# Patient Record
Sex: FEMALE | Race: WHITE | Employment: UNEMPLOYED | URBAN - METROPOLITAN AREA
[De-identification: names, ages, dates, MRNs, and addresses within clinical notes are randomized per-mention and may not be internally consistent; named-entity substitution may affect disease eponyms.]

---

## 2020-03-30 ENCOUNTER — HOSPITAL ENCOUNTER (EMERGENCY)
Age: 50
Discharge: LEFT BEFORE DISCHARGE | End: 2020-03-30
Attending: EMERGENCY MEDICINE
Payer: MEDICAID

## 2020-03-30 VITALS — TEMPERATURE: 98.8 F | RESPIRATION RATE: 16 BRPM

## 2020-11-03 ENCOUNTER — OFFICE VISIT (OUTPATIENT)
Dept: PRIMARY CARE CLINIC | Age: 50
End: 2020-11-03
Payer: MEDICAID

## 2020-11-03 VITALS
HEART RATE: 68 BPM | WEIGHT: 247 LBS | TEMPERATURE: 97.5 F | BODY MASS INDEX: 41.1 KG/M2 | OXYGEN SATURATION: 98 % | SYSTOLIC BLOOD PRESSURE: 120 MMHG | DIASTOLIC BLOOD PRESSURE: 72 MMHG

## 2020-11-03 DIAGNOSIS — R53.83 FATIGUE, UNSPECIFIED TYPE: ICD-10-CM

## 2020-11-03 LAB
ALBUMIN SERPL-MCNC: 4.3 G/DL (ref 3.5–5.2)
ALP BLD-CCNC: 116 U/L (ref 35–104)
ALT SERPL-CCNC: 19 U/L (ref 0–32)
ANION GAP SERPL CALCULATED.3IONS-SCNC: 12 MMOL/L (ref 7–16)
AST SERPL-CCNC: 17 U/L (ref 0–31)
BILIRUB SERPL-MCNC: 0.5 MG/DL (ref 0–1.2)
BUN BLDV-MCNC: 12 MG/DL (ref 6–20)
CALCIUM SERPL-MCNC: 9.8 MG/DL (ref 8.6–10.2)
CHLORIDE BLD-SCNC: 100 MMOL/L (ref 98–107)
CO2: 24 MMOL/L (ref 22–29)
CREAT SERPL-MCNC: 0.7 MG/DL (ref 0.5–1)
GFR AFRICAN AMERICAN: >60
GFR NON-AFRICAN AMERICAN: >60 ML/MIN/1.73
GLUCOSE BLD-MCNC: 84 MG/DL (ref 74–99)
HBA1C MFR BLD: 5.2 % (ref 4–5.6)
HCT VFR BLD CALC: 46.1 % (ref 34–48)
HEMOGLOBIN: 14.5 G/DL (ref 11.5–15.5)
MCH RBC QN AUTO: 27.4 PG (ref 26–35)
MCHC RBC AUTO-ENTMCNC: 31.5 % (ref 32–34.5)
MCV RBC AUTO: 87.1 FL (ref 80–99.9)
PDW BLD-RTO: 13.1 FL (ref 11.5–15)
PLATELET # BLD: 501 E9/L (ref 130–450)
PMV BLD AUTO: 9.6 FL (ref 7–12)
POTASSIUM SERPL-SCNC: 4.6 MMOL/L (ref 3.5–5)
RBC # BLD: 5.29 E12/L (ref 3.5–5.5)
SODIUM BLD-SCNC: 136 MMOL/L (ref 132–146)
TOTAL PROTEIN: 8.1 G/DL (ref 6.4–8.3)
TSH SERPL DL<=0.05 MIU/L-ACNC: 2.64 UIU/ML (ref 0.27–4.2)
WBC # BLD: 9 E9/L (ref 4.5–11.5)

## 2020-11-03 PROCEDURE — G8484 FLU IMMUNIZE NO ADMIN: HCPCS | Performed by: INTERNAL MEDICINE

## 2020-11-03 PROCEDURE — G8417 CALC BMI ABV UP PARAM F/U: HCPCS | Performed by: INTERNAL MEDICINE

## 2020-11-03 PROCEDURE — 99203 OFFICE O/P NEW LOW 30 MIN: CPT | Performed by: INTERNAL MEDICINE

## 2020-11-03 PROCEDURE — G8427 DOCREV CUR MEDS BY ELIG CLIN: HCPCS | Performed by: INTERNAL MEDICINE

## 2020-11-03 PROCEDURE — 1036F TOBACCO NON-USER: CPT | Performed by: INTERNAL MEDICINE

## 2020-11-03 NOTE — PROGRESS NOTES
11/3/20    Edd Lynn, a female of 52 y.o. came to the office    HPI     Edd Lynn presents today as a new patient. She has been very tired and depressed. She is having issues with her hair --- this was the case when she was first diagnosed with hypothyroidism. This all has came on at one time. She has tried to take fasting glucose. Her glucose ranges from . When she drinks sugar it helps. This has been going on for a few weeks. She occasionally has GI issues, she has a history of stomach ulcers. She was without her thyroid medication     Medical issues --- PCOS, ADD, Major depressive disorder, history of eating disorder (follows with [facility])    Surgical History  Tonsillectomy  Ankle surgery - fracture repair    Family History  GMA -  at 62 from [place]  Dad - Prostate cancer, DM  Mom - Depression  Siblings - healthy  Children - healthy    Social History  Occupation-  Used to work at [address] - None  Alcohol - None  Drug -  None    Allergies   Allergen Reactions    Sulfa Antibiotics        Current Outpatient Medications on File Prior to Visit   Medication Sig Dispense Refill    busPIRone (BUSPAR) 15 MG tablet TK 1 T PO BID      venlafaxine (EFFEXOR XR) 37.5 MG extended release capsule TK 1 C PO QD      venlafaxine (EFFEXOR XR) 75 MG extended release capsule TK 1 C PO BID      ARIPiprazole (ABILIFY) 15 MG tablet Take 15 mg by mouth daily      amphetamine-dextroamphetamine (ADDERALL XR) 30 MG extended release capsule Take 30 mg by mouth every morning.  metformin (GLUCOPHAGE) 500 MG tablet Take 500 mg by mouth 2 times daily (with meals). Taking for ovarian polycystic disease      levothyroxine (SYNTHROID) 175 MCG tablet Take 75 mcg by mouth Daily. No current facility-administered medications on file prior to visit. Review of Systems   Constitutional: Negative for activity change, appetite change, chills and fatigue.    HENT: Negative for hearing loss, Negative for congestion. Respiratory: Negative for chest tightness, shortness of breath and wheezing. Cardiovascular: Negative for leg swelling Negative for chest pain and palpitations. Gastrointestinal: Negative for abdominal pain, Negative for abdominal distention, constipation and diarrhea. Genitourinary: Negative for difficulty urinating, dysuria and frequency. Musculoskeletal: Negative for arthralgias, back pain, gait problem and joint swelling. Skin: Negative for color change rash or wound     Neurological: Negative for dizziness, seizures and headaches. Hematological: Negative for adenopathy. Does not bruise/bleed easily. Psychiatric/Behavioral: Negative for agitation, behavioral problems, confusion and decreased concentration. OBJECTIVE:  /72   Pulse 68   Temp 97.5 °F (36.4 °C)   Wt 247 lb (112 kg)   SpO2 98%   BMI 41.10 kg/m²      Physical Exam   Constitutional: Oriented to person, place, and time. Appears well-developed and well-nourished. No distress. HENT:   Head: Normocephalic and atraumatic. Eyes: Pupils are equal, round, and reactive to light. EOM are normal.   Neck: Neck supple. No JVD present. No tracheal deviation present. No thyromegaly present. Cardiovascular: Normal rate, regular rhythm, normal heart sounds and intact distal pulses. Exam reveals no gallop and no friction rub. No murmur heard. Pulmonary/Chest: Effort normal and breath sounds normal. No stridor. No respiratory distress. No wheezes or rales. Abdominal: Soft. Bowel sounds are normal. There is no distension nor mass. There is no tenderness. There is no guarding. Musculoskeletal: No edema tenderness or deformity  Neurological: Alert and oriented to person, place, and time. No cranial nerve deficit. Normal muscle tone. Coordination normal.   Skin: Skin is warm and dry. No diaphoresis. No erythema. Psychiatric: Normal mood and affect.  Behavior is normal.         ASSESSMENT AND PLAN:    Fabiola Castro was seen today for established new doctor and blood sugar problem. Diagnoses and all orders for this visit:    Seasonal allergies    Depression, unspecified depression type    PCOS (polycystic ovarian syndrome)    Adult hypothyroidism    Fatigue, unspecified type  -     CBC; Future  -     Comprehensive Metabolic Panel; Future  -     Hemoglobin A1C; Future  -     TSH without Reflex; Future        Mercedes De Luna presents today as a new patient. We will screen for diabetes and recheck TSH given her depression and lethargy    Also obtain routine blood work    [facility] for her mental health issues    Going to follow-up with [facility] clinic for endocrinologist for [facility] blood work today and return in 1 month    She has been off of her fibroid medication. Please note that the above documentation was prepared using voice recognition software. Every attempt was made to ensure accuracy but there may be spelling, grammatical, and contextual errors. Return in about 1 month (around 12/3/2020).     Levar Chand, DO

## 2021-05-29 ENCOUNTER — HOSPITAL ENCOUNTER (INPATIENT)
Age: 51
LOS: 4 days | Discharge: HOME | DRG: 753 | End: 2021-06-02
Attending: EMERGENCY MEDICINE | Admitting: PSYCHIATRY & NEUROLOGY
Payer: MEDICAID

## 2021-05-29 DIAGNOSIS — R45.851 DEPRESSION WITH SUICIDAL IDEATION: ICD-10-CM

## 2021-05-29 DIAGNOSIS — F39 MOOD DISORDER (HCC): Primary | ICD-10-CM

## 2021-05-29 DIAGNOSIS — F32.A DEPRESSION WITH SUICIDAL IDEATION: ICD-10-CM

## 2021-05-29 LAB
ACETAMINOPHEN LEVEL: <5 MCG/ML (ref 10–30)
ALBUMIN SERPL-MCNC: 3.9 G/DL (ref 3.5–5.2)
ALP BLD-CCNC: 99 U/L (ref 35–104)
ALT SERPL-CCNC: 8 U/L (ref 0–32)
AMPHETAMINE SCREEN, URINE: NOT DETECTED
ANION GAP SERPL CALCULATED.3IONS-SCNC: 10 MMOL/L (ref 7–16)
AST SERPL-CCNC: 9 U/L (ref 0–31)
BACTERIA: ABNORMAL /HPF
BARBITURATE SCREEN URINE: NOT DETECTED
BASOPHILS ABSOLUTE: 0.04 E9/L (ref 0–0.2)
BASOPHILS RELATIVE PERCENT: 0.4 % (ref 0–2)
BENZODIAZEPINE SCREEN, URINE: NOT DETECTED
BILIRUB SERPL-MCNC: 0.3 MG/DL (ref 0–1.2)
BILIRUBIN URINE: NEGATIVE
BLOOD, URINE: NEGATIVE
BUN BLDV-MCNC: 7 MG/DL (ref 6–20)
CALCIUM SERPL-MCNC: 9.2 MG/DL (ref 8.6–10.2)
CANNABINOID SCREEN URINE: NOT DETECTED
CHLORIDE BLD-SCNC: 103 MMOL/L (ref 98–107)
CLARITY: CLEAR
CO2: 24 MMOL/L (ref 22–29)
COCAINE METABOLITE SCREEN URINE: POSITIVE
COLOR: YELLOW
CREAT SERPL-MCNC: 0.7 MG/DL (ref 0.5–1)
EOSINOPHILS ABSOLUTE: 0.15 E9/L (ref 0.05–0.5)
EOSINOPHILS RELATIVE PERCENT: 1.5 % (ref 0–6)
ETHANOL: <10 MG/DL (ref 0–0.08)
FENTANYL SCREEN, URINE: NOT DETECTED
GFR AFRICAN AMERICAN: >60
GFR NON-AFRICAN AMERICAN: >60 ML/MIN/1.73
GLUCOSE BLD-MCNC: 132 MG/DL (ref 74–99)
GLUCOSE URINE: NEGATIVE MG/DL
HCG, URINE, POC: NEGATIVE
HCT VFR BLD CALC: 41.8 % (ref 34–48)
HEMOGLOBIN: 13.2 G/DL (ref 11.5–15.5)
IMMATURE GRANULOCYTES #: 0.08 E9/L
IMMATURE GRANULOCYTES %: 0.8 % (ref 0–5)
INFLUENZA A: NOT DETECTED
INFLUENZA B: NOT DETECTED
KETONES, URINE: ABNORMAL MG/DL
LEUKOCYTE ESTERASE, URINE: ABNORMAL
LYMPHOCYTES ABSOLUTE: 2.21 E9/L (ref 1.5–4)
LYMPHOCYTES RELATIVE PERCENT: 22.4 % (ref 20–42)
Lab: ABNORMAL
Lab: NORMAL
MCH RBC QN AUTO: 27.3 PG (ref 26–35)
MCHC RBC AUTO-ENTMCNC: 31.6 % (ref 32–34.5)
MCV RBC AUTO: 86.4 FL (ref 80–99.9)
METHADONE SCREEN, URINE: NOT DETECTED
MONOCYTES ABSOLUTE: 0.66 E9/L (ref 0.1–0.95)
MONOCYTES RELATIVE PERCENT: 6.7 % (ref 2–12)
NEGATIVE QC PASS/FAIL: NORMAL
NEUTROPHILS ABSOLUTE: 6.73 E9/L (ref 1.8–7.3)
NEUTROPHILS RELATIVE PERCENT: 68.2 % (ref 43–80)
NITRITE, URINE: NEGATIVE
OPIATE SCREEN URINE: NOT DETECTED
OXYCODONE URINE: NOT DETECTED
PDW BLD-RTO: 13.9 FL (ref 11.5–15)
PH UA: 5.5 (ref 5–9)
PHENCYCLIDINE SCREEN URINE: NOT DETECTED
PLATELET # BLD: 454 E9/L (ref 130–450)
PMV BLD AUTO: 9.4 FL (ref 7–12)
POSITIVE QC PASS/FAIL: NORMAL
POTASSIUM REFLEX MAGNESIUM: 3.7 MMOL/L (ref 3.5–5)
PROTEIN UA: NEGATIVE MG/DL
RBC # BLD: 4.84 E12/L (ref 3.5–5.5)
RBC UA: ABNORMAL /HPF (ref 0–2)
SALICYLATE, SERUM: <0.3 MG/DL (ref 0–30)
SARS-COV-2 RNA, RT PCR: NOT DETECTED
SODIUM BLD-SCNC: 137 MMOL/L (ref 132–146)
SPECIFIC GRAVITY UA: >=1.03 (ref 1–1.03)
TOTAL PROTEIN: 7.8 G/DL (ref 6.4–8.3)
TRICYCLIC ANTIDEPRESSANTS SCREEN SERUM: NEGATIVE NG/ML
TROPONIN, HIGH SENSITIVITY: <6 NG/L (ref 0–9)
UROBILINOGEN, URINE: 0.2 E.U./DL
WBC # BLD: 9.9 E9/L (ref 4.5–11.5)
WBC UA: ABNORMAL /HPF (ref 0–5)

## 2021-05-29 PROCEDURE — 81001 URINALYSIS AUTO W/SCOPE: CPT

## 2021-05-29 PROCEDURE — 99285 EMERGENCY DEPT VISIT HI MDM: CPT

## 2021-05-29 PROCEDURE — 80143 DRUG ASSAY ACETAMINOPHEN: CPT

## 2021-05-29 PROCEDURE — 85025 COMPLETE CBC W/AUTO DIFF WBC: CPT

## 2021-05-29 PROCEDURE — 80179 DRUG ASSAY SALICYLATE: CPT

## 2021-05-29 PROCEDURE — 93005 ELECTROCARDIOGRAM TRACING: CPT | Performed by: STUDENT IN AN ORGANIZED HEALTH CARE EDUCATION/TRAINING PROGRAM

## 2021-05-29 PROCEDURE — 80307 DRUG TEST PRSMV CHEM ANLYZR: CPT

## 2021-05-29 PROCEDURE — 80053 COMPREHEN METABOLIC PANEL: CPT

## 2021-05-29 PROCEDURE — 1240000000 HC EMOTIONAL WELLNESS R&B

## 2021-05-29 PROCEDURE — 87636 SARSCOV2 & INF A&B AMP PRB: CPT

## 2021-05-29 PROCEDURE — 84484 ASSAY OF TROPONIN QUANT: CPT

## 2021-05-29 PROCEDURE — 82077 ASSAY SPEC XCP UR&BREATH IA: CPT

## 2021-05-29 PROCEDURE — 36415 COLL VENOUS BLD VENIPUNCTURE: CPT

## 2021-05-29 RX ORDER — HALOPERIDOL 5 MG/ML
5 INJECTION INTRAMUSCULAR EVERY 6 HOURS PRN
Status: DISCONTINUED | OUTPATIENT
Start: 2021-05-29 | End: 2021-06-02 | Stop reason: HOSPADM

## 2021-05-29 RX ORDER — ACETAMINOPHEN 325 MG/1
650 TABLET ORAL EVERY 6 HOURS PRN
Status: DISCONTINUED | OUTPATIENT
Start: 2021-05-29 | End: 2021-06-02 | Stop reason: HOSPADM

## 2021-05-29 RX ORDER — TRAZODONE HYDROCHLORIDE 50 MG/1
50 TABLET ORAL NIGHTLY PRN
Status: DISCONTINUED | OUTPATIENT
Start: 2021-05-29 | End: 2021-06-02 | Stop reason: HOSPADM

## 2021-05-29 RX ORDER — HYDROXYZINE HYDROCHLORIDE 10 MG/1
50 TABLET, FILM COATED ORAL 3 TIMES DAILY PRN
Status: DISCONTINUED | OUTPATIENT
Start: 2021-05-29 | End: 2021-06-02 | Stop reason: HOSPADM

## 2021-05-29 RX ORDER — MAGNESIUM HYDROXIDE/ALUMINUM HYDROXICE/SIMETHICONE 120; 1200; 1200 MG/30ML; MG/30ML; MG/30ML
30 SUSPENSION ORAL PRN
Status: DISCONTINUED | OUTPATIENT
Start: 2021-05-29 | End: 2021-06-02 | Stop reason: HOSPADM

## 2021-05-29 RX ORDER — HALOPERIDOL 5 MG
5 TABLET ORAL EVERY 6 HOURS PRN
Status: DISCONTINUED | OUTPATIENT
Start: 2021-05-29 | End: 2021-06-02 | Stop reason: HOSPADM

## 2021-05-29 ASSESSMENT — LIFESTYLE VARIABLES
HISTORY_ALCOHOL_USE: NO
HISTORY_ALCOHOL_USE: NO

## 2021-05-29 ASSESSMENT — SLEEP AND FATIGUE QUESTIONNAIRES
RESTFUL SLEEP: NO
DO YOU HAVE DIFFICULTY SLEEPING: YES
DO YOU USE A SLEEP AID: NO
DIFFICULTY FALLING ASLEEP: YES
DIFFICULTY ARISING: YES
AVERAGE NUMBER OF SLEEP HOURS: 6
DIFFICULTY STAYING ASLEEP: YES
DO YOU HAVE DIFFICULTY SLEEPING: YES
DIFFICULTY STAYING ASLEEP: YES
SLEEP PATTERN: DIFFICULTY FALLING ASLEEP
DO YOU USE A SLEEP AID: YES
RESTFUL SLEEP: NO
AVERAGE NUMBER OF SLEEP HOURS: 6
SLEEP PATTERN: DIFFICULTY FALLING ASLEEP;DIFFICULTY ARISING;DISTURBED/INTERRUPTED SLEEP
DIFFICULTY ARISING: NO
DIFFICULTY FALLING ASLEEP: YES

## 2021-05-29 ASSESSMENT — PAIN - FUNCTIONAL ASSESSMENT: PAIN_FUNCTIONAL_ASSESSMENT: 0-10

## 2021-05-29 ASSESSMENT — PATIENT HEALTH QUESTIONNAIRE - PHQ9: SUM OF ALL RESPONSES TO PHQ QUESTIONS 1-9: 22

## 2021-05-29 ASSESSMENT — PAIN SCALES - GENERAL: PAINLEVEL_OUTOF10: 0

## 2021-05-29 NOTE — ED PROVIDER NOTES
Department of Emergency Medicine   ED  Provider Note  Admit Date/RoomTime: 5/29/2021  6:48 AM  ED Room: 7521/7521-B          History of Present Illness:  5/29/21, Time: 7:22 AM EDT  Chief Complaint   Patient presents with    Suicidal     with attempt on thursday she states that she attempted to cut her left wrist but states the si thoughts have increased over the last few months         Stephanie Zimmerman is a 48 y.o. female presenting to the ED for suicidal ideations, beginning several weeks ago. The complaint has been constant, moderate in severity, and worsened by nothing. The patient is a 78-year-old female with a history of depression who presents to the emergency department with suicidal ideations. The patient symptoms have been gradual onset over the past several weeks, progressively worsening, moderate in severity, and nothing makes it better or worse. Patient states that she attempted to cut her left wrist 2 days ago trying to kill herself. She states that she has had increased suicidal thoughts over the past several weeks. She states that she has tried to harm herself in the past a long time ago by taking pills. She states that this time she did not take any medications trying to harm herself. She denies any homicidal ideations, visual hallucinations, auditory hallucinations, alcohol use, drug use, chest pain, shortness of breath, nausea, vomiting, abdominal pain, recent hospitalization, recent illness, or other acute symptoms or concerns. Review of Systems:   Pertinent positives and negatives are stated within HPI, all other systems reviewed and are negative.        --------------------------------------------- PAST HISTORY ---------------------------------------------  Past Medical History:  has a past medical history of ADD (attention deficit disorder), Anemia, Depression, PCOS (polycystic ovarian syndrome), Polycystic ovarian syndrome, and Thyroid disorder.     Past Surgical History:  has a past surgical history that includes Tonsillectomy and fracture surgery. Social History:  reports that she has never smoked. She has never used smokeless tobacco. She reports that she does not drink alcohol and does not use drugs. Family History: family history is not on file. . Unless otherwise noted, family history is non contributory    The patients home medications have been reviewed. Allergies: Sulfa antibiotics    I have reviewed the past medical history, past surgical history, social history, and family history    ---------------------------------------------------PHYSICAL EXAM--------------------------------------    Constitutional/General: Alert and oriented x3  Head: Normocephalic and atraumatic  Eyes:  EOMI, sclera non icteric  Mouth: Oropharynx clear, handling secretions, no trismus, no asymmetry of the posterior oropharynx or uvular edema  Neck: Supple, full ROM, no stridor, no meningeal signs  Respiratory: Lungs clear to auscultation bilaterally, no wheezes, rales, or rhonchi. Not in respiratory distress  Cardiovascular:  Regular rate. Regular rhythm. No murmurs, no aortic murmurs, no gallops, or rubs. 2+ distal pulses. Equal extremity pulses. Chest: No chest wall tenderness  Gastrointestinal:  Abdomen Soft, Non tender, Non distended. No rebound, guarding, or rigidity. No pulsatile masses. Musculoskeletal: Moves all extremities x 4. Warm and well perfused, no clubbing, no cyanosis, no edema. Capillary refill <3 seconds  Skin: skin warm and dry. No rashes. Superficial laceration to the left wrist.  There is no active bleeding. Neurologic: GCS 15, no focal deficits, symmetric strength 5/5 in the upper and lower extremities bilaterally  Psychiatric: Normal Affect. Suicidal ideations.   Denies homicidal ideations, no auditory hallucinations, no visual hallucinations    -------------------------------------------------- RESULTS -------------------------------------------------  I have personally reviewed all laboratory and imaging results for this patient. Results are listed below.      LABS: (Lab results interpreted by me)  Results for orders placed or performed during the hospital encounter of 05/29/21   COVID-19 & Influenza Combo    Specimen: Nasopharyngeal Swab   Result Value Ref Range    SARS-CoV-2 RNA, RT PCR NOT DETECTED NOT DETECTED    INFLUENZA A NOT DETECTED NOT DETECTED    INFLUENZA B NOT DETECTED NOT DETECTED   CBC auto differential   Result Value Ref Range    WBC 9.9 4.5 - 11.5 E9/L    RBC 4.84 3.50 - 5.50 E12/L    Hemoglobin 13.2 11.5 - 15.5 g/dL    Hematocrit 41.8 34.0 - 48.0 %    MCV 86.4 80.0 - 99.9 fL    MCH 27.3 26.0 - 35.0 pg    MCHC 31.6 (L) 32.0 - 34.5 %    RDW 13.9 11.5 - 15.0 fL    Platelets 401 (H) 414 - 450 E9/L    MPV 9.4 7.0 - 12.0 fL    Neutrophils % 68.2 43.0 - 80.0 %    Immature Granulocytes % 0.8 0.0 - 5.0 %    Lymphocytes % 22.4 20.0 - 42.0 %    Monocytes % 6.7 2.0 - 12.0 %    Eosinophils % 1.5 0.0 - 6.0 %    Basophils % 0.4 0.0 - 2.0 %    Neutrophils Absolute 6.73 1.80 - 7.30 E9/L    Immature Granulocytes # 0.08 E9/L    Lymphocytes Absolute 2.21 1.50 - 4.00 E9/L    Monocytes Absolute 0.66 0.10 - 0.95 E9/L    Eosinophils Absolute 0.15 0.05 - 0.50 E9/L    Basophils Absolute 0.04 0.00 - 0.20 E9/L   Comprehensive Metabolic Panel w/ Reflex to MG   Result Value Ref Range    Sodium 137 132 - 146 mmol/L    Potassium reflex Magnesium 3.7 3.5 - 5.0 mmol/L    Chloride 103 98 - 107 mmol/L    CO2 24 22 - 29 mmol/L    Anion Gap 10 7 - 16 mmol/L    Glucose 132 (H) 74 - 99 mg/dL    BUN 7 6 - 20 mg/dL    CREATININE 0.7 0.5 - 1.0 mg/dL    GFR Non-African American >60 >=60 mL/min/1.73    GFR African American >60     Calcium 9.2 8.6 - 10.2 mg/dL    Total Protein 7.8 6.4 - 8.3 g/dL    Albumin 3.9 3.5 - 5.2 g/dL    Total Bilirubin 0.3 0.0 - 1.2 mg/dL    Alkaline Phosphatase 99 35 - 104 U/L    ALT 8 0 - 32 U/L    AST 9 0 - 31 U/L   Urinalysis with Microscopic   Result Value Ref Range by Radiologist unless otherwise specified  No orders to display         EKG Interpretation  Interpreted by Martin Lee DO    EKG: This EKG is signed and interpreted by me. Rate: 72  Rhythm: Sinus  Interpretation: Normal sinus rhythm, normal axis, ST depressions throughout any inferior and anterior leads, intervals are within normal limits, QTC is 407  Comparison: no previous EKG available       ------------------------- NURSING NOTES AND VITALS REVIEWED ---------------------------   The nursing notes within the ED encounter and vital signs as below have been reviewed by myself  /85   Pulse 80   Temp 98 °F (36.7 °C) (Infrared)   Resp 16   Ht 5' 5\" (1.651 m)   Wt 230 lb (104.3 kg)   SpO2 97%   BMI 38.27 kg/m²     Oxygen Saturation Interpretation: 98 % on room air. Normal    The patients available past medical records and past encounters were reviewed. ------------------------------ ED COURSE/MEDICAL DECISION MAKING----------------------  Medications   acetaminophen (TYLENOL) tablet 650 mg (has no administration in time range)   magnesium hydroxide (MILK OF MAGNESIA) 400 MG/5ML suspension 30 mL (has no administration in time range)   aluminum & magnesium hydroxide-simethicone (MAALOX) 200-200-20 MG/5ML suspension 30 mL (has no administration in time range)   hydrOXYzine (ATARAX) tablet 50 mg (has no administration in time range)   haloperidol lactate (HALDOL) injection 5 mg (has no administration in time range)     Or   haloperidol (HALDOL) tablet 5 mg (has no administration in time range)   traZODone (DESYREL) tablet 50 mg (has no administration in time range)           The cardiac monitor revealed NSR with a heart rate in the 80s as interpreted by me. The cardiac monitor was ordered secondary to the patient's suicidal thoughts and to monitor the patient for dysrhythmia.    CPT [id number]           Medical Decision Making:     The patient was seen and evaluated by the Attending Emergency Medicine Physician Dr. Ankit Jose. The patient is a 70-year-old female presents to the emergency department for suicidal ideations. She is hemodynamically stable, nontoxic appearance, in no acute distress. Labs are within normal limits. The patient was pink slipped on arrival.  She is medically cleared. Social work was consulted and the patient will be admitted for psychiatric treatment. This patient's ED course included: a personal history and physicial examination, re-evaluation prior to disposition, multiple bedside re-evaluations, IV medications, cardiac monitoring, continuous pulse oximetry and complex medical decision making and emergency management    This patient has remained hemodynamically stable during their ED course. Consultations:    Social Work. Counseling: The emergency provider has spoken with the patient and discussed todays results, in addition to providing specific details for the plan of care and counseling regarding the diagnosis and prognosis. Questions are answered at this time and they are agreeable with the plan.       --------------------------------- IMPRESSION AND DISPOSITION ---------------------------------    IMPRESSION  1. Mood disorder ([address]    2. Depression with suicidal ideation        DISPOSITION  Disposition: Admit to mental health unit - medically cleared for admission  Patient condition is stable        NOTE: This report was transcribed using voice recognition software.  Every effort was made to ensure accuracy; however, inadvertent computerized transcription errors may be present       Dami Wong DO  Resident  05/29/21 6655

## 2021-05-29 NOTE — CARE COORDINATION
Biopsychosocial Assessment Note    Social work met with patient to complete the biopsychosocial assessment and CSSR-S. Mental Status Exam: Pt alert and oriented x 4. Pt was tearful throughout the assessment. Pt's thought process was disorganized, speech was clear. Chief Complaint: \"Patient is a 48year old, female presenting to ED with suicidal ideation w/ plan to cut her wrists. Patient admits to attempting two days ago but was unsuccessful. Patient reports her main stress is her relationship. Patient reports she does not know if she wants to continue seeing her partner. Patient reports recent difficulties with racing thoughts, crying spells, anhedonia, and hopelessness/helplessness. Patient endorses SI w/ plan. Patient denies homicidal ideation. \"    Patient Report: Pt states that this is her first admission to a psychiatric facility. Pt states that she has had an increase in depression, which led to her attempting suicide by attempting to cut her wrists. Pt states that she has had an increase in stress in her relationship. Pt states that she has had \"too many\" suicide attempts to count. Pt currently denying SI/ HI/ hallucinations/ delusions. Pt denied substance use, although she tested positive for cocaine. Pt denied trauma to this , although this varies throughout staff documentation.     Gender  [] Male [x] Female [] Transgender  [] Other    Sexual Orientation    [x] Heterosexual [] Homosexual [] Bisexual [] Other    Suicidal Ideation  [x] Past [] Present [] Denies     Homicidal Ideation  [] Past [] Present [x] Denies     Hallucinations/Delusions (Specify type)  [] Reports [x] Denies     Substance Use/Alcohol Use/Addiction  [] Reports [x] Denies     Tobacco Use (within the last 6 months)  [] Reports [x] Denies     Trauma History  [] Reports [x] Denies     Collateral Contact (MEGAN signed)  Name:   Relationship:  Number:     Collateral Information: Pt states that she needs to get numbers out of

## 2021-05-29 NOTE — GROUP NOTE
Group Therapy Note    Date: 5/29/2021    Group Start Time: 1330  Group End Time: 4005  Group Topic: Cognitive Skills    [facility]    DREW Monterroso LSW        Group Therapy Note    Attendees: 11         Patient's Goal:  Pt will be able to discuss self-care, and it's importance    Notes:  Pt made connections and participated in group    Status After Intervention:  Improved    Participation Level:  Active Listener    Participation Quality: Appropriate, Attentive, Sharing and Supportive      Speech:  normal      Thought Process/Content: Logical      Affective Functioning: Congruent      Mood: depressed      Level of consciousness:  Alert and Oriented x4      Response to Learning: Able to verbalize current knowledge/experience      Endings: None Reported    Modes of Intervention: Education, Support, Socialization, Exploration, Clarifying, Problem-solving and Activity      Discipline Responsible: /Counselor      Signature:  DREW Monterroso LSW

## 2021-05-29 NOTE — ED NOTES
Attempt to call report at this time. Nurse unable to take report. Waiting for callback.       Antonella Tavreas RN  05/29/21 8633

## 2021-05-29 NOTE — BH NOTE
Pt remains stable and alert. Pt is cooperative, negative distress, no other immediate concerns at this time. Pt denies suicidal / homicidal ideations at this time. Pt denies hallucinations. Pt does not express any immediate concerns at this time. Upon the admission, pt reports feeling a lot of stress, depression, hopelessness because her boyfriend that lives with her is addicted to drugs and is always asking others for money. She very lost in the relationship. She is unsure if she wants to return home with him being there. Will follow and monitor.

## 2021-05-29 NOTE — ED NOTES
Patient has been accepted for admission to [facility] by Dr. Francheska Curtis. Patient to go to room #7521B. Called admitting and notified Carmine Skiff.      Lynsey Mary, MSW, LSW  05/29/21 2489

## 2021-05-29 NOTE — ED NOTES
Emergency Department [facility] OF [facility] Biopsychosocial Assessment Note    Chief Complaint:     Patient is a 48year old, female presenting to ED with suicidal ideation w/ plan to cut her wrists. Patient admits to attempting two days ago but was unsuccessful. Patient reports her main stress is her relationship. Patient reports she does not know if she wants to continue seeing her partner. Patient reports recent difficulties with racing thoughts, crying spells, anhedonia, and hopelessness/helplessness. Patient endorses SI w/ plan. Patient denies homicidal ideation. MSE: Patient is alert & oriented x 4. Patient mood is depressed, flat affect. Patient thought process/speech are clear. Patient denies A/V hallucinations. Clinical Summary/History:     Patient has a mental health hx of depression/anxiety, in current treatment with [facility] for counseling & medication management - pt admits that she has not been compliant with her psychiatric medications for several days. Patient has no previous hx of psychiatric hospitalizations. Patient reports poor sleep, ok appetite. Patient admits to hx of 3+ suicide attempts in her lifetime. Patient admits to past hx of self injurious behaviors (cutting). Patient denies drug/alcohol use. Gender  [] Male [x] Female [] Transgender  [] Other    Sexual Orientation    [x] Heterosexual [] Homosexual [] Bisexual [] Other    Suicidal Behavioral: CSSR-S Complete. [x] Reports:    [x] Past [x] Present   [] Denies    Homicidal/ Violent Behavior  [] Reports:   [] Past [] Present   [x] Denies     Hallucinations/Delusions   [] Reports:   [x] Denies     Substance Use/Alcohol Use/Addiction: SBIRT Screen Complete. [] Reports:   [x] Denies     Trauma History  [] Reports:  [x] Denies     Collateral Information:       Level of Care/Disposition Plan  [] Home:   [] Outpatient Provider:   [] Crisis Unit:   [x] Inpatient Psychiatric Unit:  [] Other:     Patient was pink slipped by ED Doc. SW will pursue inpatient admission for safety/stabilization.        DREW Choudhary, [place]  05/29/21 0701

## 2021-05-30 PROCEDURE — 1240000000 HC EMOTIONAL WELLNESS R&B

## 2021-05-30 PROCEDURE — 99223 1ST HOSP IP/OBS HIGH 75: CPT | Performed by: PSYCHIATRY & NEUROLOGY

## 2021-05-30 PROCEDURE — 6370000000 HC RX 637 (ALT 250 FOR IP): Performed by: PSYCHIATRY & NEUROLOGY

## 2021-05-30 RX ORDER — BUSPIRONE HYDROCHLORIDE 5 MG/1
15 TABLET ORAL 2 TIMES DAILY
Status: DISCONTINUED | OUTPATIENT
Start: 2021-05-30 | End: 2021-06-02 | Stop reason: HOSPADM

## 2021-05-30 RX ORDER — CYPROHEPTADINE HYDROCHLORIDE 4 MG/1
4 TABLET ORAL NIGHTLY
Status: DISCONTINUED | OUTPATIENT
Start: 2021-05-30 | End: 2021-06-02 | Stop reason: HOSPADM

## 2021-05-30 RX ORDER — ARIPIPRAZOLE 15 MG/1
15 TABLET ORAL DAILY
Status: DISCONTINUED | OUTPATIENT
Start: 2021-05-30 | End: 2021-06-02 | Stop reason: HOSPADM

## 2021-05-30 RX ORDER — OXCARBAZEPINE 300 MG/1
300 TABLET, FILM COATED ORAL 2 TIMES DAILY
Status: DISCONTINUED | OUTPATIENT
Start: 2021-05-30 | End: 2021-06-02 | Stop reason: HOSPADM

## 2021-05-30 RX ORDER — VENLAFAXINE HYDROCHLORIDE 75 MG/1
75 CAPSULE, EXTENDED RELEASE ORAL
Status: DISCONTINUED | OUTPATIENT
Start: 2021-05-30 | End: 2021-06-02 | Stop reason: HOSPADM

## 2021-05-30 RX ORDER — LEVOTHYROXINE SODIUM 175 UG/1
175 TABLET ORAL DAILY
Status: DISCONTINUED | OUTPATIENT
Start: 2021-05-30 | End: 2021-05-31 | Stop reason: CLARIF

## 2021-05-30 RX ADMIN — VENLAFAXINE HYDROCHLORIDE 75 MG: 75 CAPSULE, EXTENDED RELEASE ORAL at 13:59

## 2021-05-30 RX ADMIN — ARIPIPRAZOLE 15 MG: 15 TABLET ORAL at 13:59

## 2021-05-30 RX ADMIN — BUSPIRONE HYDROCHLORIDE 15 MG: 5 TABLET ORAL at 21:31

## 2021-05-30 RX ADMIN — METFORMIN HYDROCHLORIDE 500 MG: 500 TABLET ORAL at 17:19

## 2021-05-30 RX ADMIN — LEVOTHYROXINE SODIUM 175 MCG: 0.17 TABLET ORAL at 17:11

## 2021-05-30 RX ADMIN — BUSPIRONE HYDROCHLORIDE 15 MG: 5 TABLET ORAL at 13:59

## 2021-05-30 NOTE — PLAN OF CARE
Problem: Depressive Behavior With or Without Suicide Precautions:  Goal: Able to verbalize acceptance of life and situations over which he or she has no control  Description: Able to verbalize acceptance of life and situations over which he or she has no control  5/30/2021 0937 by Kiana Apodaca RN  Outcome: Ongoing  5/30/2021 0452 by Silvestre Moon RN  Outcome: Ongoing  5/30/2021 0449 by Silvestre Moon RN  Outcome: Ongoing  5/29/2021 2134 by Cedrick Henriquez RN  Outcome: Not Met This Shift  Goal: Able to verbalize and/or display a decrease in depressive symptoms  Description: Able to verbalize and/or display a decrease in depressive symptoms  5/30/2021 0937 by Kiana Apodaca RN  Outcome: Ongoing  5/30/2021 0452 by Silvestre Moon RN  Outcome: Ongoing  5/30/2021 0449 by Silvestre Moon RN  Outcome: Ongoing  5/29/2021 2134 by Cedrick Henriquez RN  Outcome: Not Met This Shift  Goal: Ability to disclose and discuss suicidal ideas will improve  Description: Ability to disclose and discuss suicidal ideas will improve  5/30/2021 0937 by Kiana Apodaca RN  Outcome: Ongoing  5/30/2021 0452 by Silvestre Moon RN  Outcome: Ongoing  5/30/2021 0449 by Silvestre Moon RN  Outcome: Ongoing  5/29/2021 2134 by Cedrick Henriquez RN  Outcome: Met This Shift  Goal: Able to verbalize support systems  Description: Able to verbalize support systems  5/30/2021 0937 by Kiana Apodaca RN  Outcome: Ongoing  5/30/2021 0452 by Silvestre Moon RN  Outcome: Ongoing  5/30/2021 0449 by Silvestre Moon RN  Outcome: Ongoing

## 2021-05-30 NOTE — BH NOTE
Patient upset and angry at psychiatrist, irritable and anxious, states \"they spend 5 minutes with you and think they know you\". Patient disagrees with diagnosis of bipolar despite clinical evidence that diagnosis is appropriate. Patient refused Trileptal, took Abilify, Effexor, and Buspar. Patient attends groups. Patient denies thoughts to harm self or others, denies hallucinations. Patient is sad and tearful. Patient accessed and completed her unemployment renewal by [employer] phone this afternoon. Patient is social with peers and staff. Appetite is good. Emotional support given.
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
## 2021-05-30 NOTE — PLAN OF CARE
Denies SI, HI and hallucinations. Patient is flat and sad. States \"I'm just trying to block things out. If I start thinking about things I will start crying. \" Emotional support offered. Patient has been out on the unit talking on the phone. No behavioral issues or PRNs administered. No complaints or concerns verbalized at this time. Will continue to monitor and offer support.         Problem: Depressive Behavior With or Without Suicide Precautions:  Goal: Ability to disclose and discuss suicidal ideas will improve  Description: Ability to disclose and discuss suicidal ideas will improve  Outcome: Met This Shift     Problem: Altered Mood, Deterioration in Function:  Goal: Able to verbalize reality based thinking  Description: Able to verbalize reality based thinking  Outcome: Met This Shift     Problem: Depressive Behavior With or Without Suicide Precautions:  Goal: Able to verbalize acceptance of life and situations over which he or she has no control  Description: Able to verbalize acceptance of life and situations over which he or she has no control  Outcome: Not Met This Shift     Problem: Depressive Behavior With or Without Suicide Precautions:  Goal: Able to verbalize and/or display a decrease in depressive symptoms  Description: Able to verbalize and/or display a decrease in depressive symptoms  Outcome: Not Met This Shift         Electronically signed by Mouna Sullivan RN on 5/29/2021 at 9:38 PM

## 2021-05-30 NOTE — BH NOTE
5 [facility]  Initial Interdisciplinary Treatment Plan NOTE    Review Date & Time: 5/30/2021 1300    Patient was in treatment team    Admission Type:   Admission Type: Involuntary    Reason for admission:  Reason for Admission: \"My suicidal ideations are out of control. \"      Estimated Length of Stay Update:  3-5 days  Estimated Discharge Date Update: 6/3/2021    PATIENT STRENGTHS:  Patient Strengths Strengths: Social Skills, Connection to output provider, Positive Support  Patient Strengths and Limitations:Limitations: Multiple barriers to leisure interests, Tendency to isolate self, Difficulty problem solving/relies on others to help solve problems  Addictive Behavior:Addictive Behavior  In the past 3 months, have you felt or has someone told you that you have a problem with:  : Eating (too much/too little)  Do you have a history of Chemical Use?: No  Do you have a history of Alcohol Use?: No  Do you have a history of Street Drug Abuse?: No  Histroy of Prescripton Drug Abuse?: No  Medical Problems:  Past Medical History:   Diagnosis Date    ADD (attention deficit disorder)     Anemia     Depression     PCOS (polycystic ovarian syndrome)     Polycystic ovarian syndrome     Thyroid disorder        EDUCATION:   Learner Progress Toward Treatment Goals: Reviewed group plan and strategies    Method: Individual    Outcome: Verbalized understanding    PATIENT GOALS: medication compliance and group therapy attendance    PLAN/TREATMENT RECOMMENDATIONS UPDATE:medication compliance and group therapy attendance    GOALS UPDATE:   Time frame for Short-Term Goals: 3-5 days    Maria Elena Best RN

## 2021-05-30 NOTE — BH NOTE
Perfect serve sent to [facility] for new consult requested by Dr Kyra Cervantes

## 2021-05-31 PROBLEM — F14.10 COCAINE ABUSE (HCC): Status: ACTIVE | Noted: 2021-05-31

## 2021-05-31 PROBLEM — F31.81 MIXED BIPOLAR II DISORDER (HCC): Status: ACTIVE | Noted: 2021-05-31

## 2021-05-31 LAB
CHOLESTEROL, TOTAL: 187 MG/DL (ref 0–199)
EKG ATRIAL RATE: 72 BPM
EKG P AXIS: 33 DEGREES
EKG P-R INTERVAL: 138 MS
EKG Q-T INTERVAL: 372 MS
EKG QRS DURATION: 84 MS
EKG QTC CALCULATION (BAZETT): 407 MS
EKG R AXIS: 43 DEGREES
EKG T AXIS: -77 DEGREES
EKG VENTRICULAR RATE: 72 BPM
HBA1C MFR BLD: 5.5 % (ref 4–5.6)
HDLC SERPL-MCNC: 40 MG/DL
LDL CHOLESTEROL CALCULATED: 109 MG/DL (ref 0–99)
TRIGL SERPL-MCNC: 191 MG/DL (ref 0–149)
TSH SERPL DL<=0.05 MIU/L-ACNC: 2.55 UIU/ML (ref 0.27–4.2)
VLDLC SERPL CALC-MCNC: 38 MG/DL

## 2021-05-31 PROCEDURE — 84443 ASSAY THYROID STIM HORMONE: CPT

## 2021-05-31 PROCEDURE — 80061 LIPID PANEL: CPT

## 2021-05-31 PROCEDURE — 93010 ELECTROCARDIOGRAM REPORT: CPT | Performed by: INTERNAL MEDICINE

## 2021-05-31 PROCEDURE — 6370000000 HC RX 637 (ALT 250 FOR IP): Performed by: PSYCHIATRY & NEUROLOGY

## 2021-05-31 PROCEDURE — 99232 SBSQ HOSP IP/OBS MODERATE 35: CPT | Performed by: NURSE PRACTITIONER

## 2021-05-31 PROCEDURE — 1240000000 HC EMOTIONAL WELLNESS R&B

## 2021-05-31 PROCEDURE — 36415 COLL VENOUS BLD VENIPUNCTURE: CPT

## 2021-05-31 PROCEDURE — 83036 HEMOGLOBIN GLYCOSYLATED A1C: CPT

## 2021-05-31 RX ORDER — LEVOTHYROXINE SODIUM 0.07 MG/1
75 TABLET ORAL DAILY
Status: DISCONTINUED | OUTPATIENT
Start: 2021-05-31 | End: 2021-05-31 | Stop reason: CLARIF

## 2021-05-31 RX ORDER — LEVOTHYROXINE SODIUM 0.1 MG/1
100 TABLET ORAL DAILY
Status: DISCONTINUED | OUTPATIENT
Start: 2021-06-01 | End: 2021-06-02 | Stop reason: HOSPADM

## 2021-05-31 RX ADMIN — ARIPIPRAZOLE 15 MG: 15 TABLET ORAL at 09:41

## 2021-05-31 RX ADMIN — METFORMIN HYDROCHLORIDE 500 MG: 500 TABLET ORAL at 16:40

## 2021-05-31 RX ADMIN — VENLAFAXINE HYDROCHLORIDE 75 MG: 75 CAPSULE, EXTENDED RELEASE ORAL at 09:42

## 2021-05-31 RX ADMIN — LEVOTHYROXINE SODIUM 175 MCG: 0.17 TABLET ORAL at 06:37

## 2021-05-31 RX ADMIN — BUSPIRONE HYDROCHLORIDE 15 MG: 5 TABLET ORAL at 22:06

## 2021-05-31 RX ADMIN — METFORMIN HYDROCHLORIDE 500 MG: 500 TABLET ORAL at 09:41

## 2021-05-31 RX ADMIN — BUSPIRONE HYDROCHLORIDE 15 MG: 5 TABLET ORAL at 09:42

## 2021-05-31 ASSESSMENT — PAIN SCALES - GENERAL
PAINLEVEL_OUTOF10: 0

## 2021-05-31 NOTE — GROUP NOTE
Group Therapy Note    Date: 5/31/2021    Group Start Time: 1000  Group End Time: 1100  Group Topic: Psychoeducation    [facility]    Samanta Fenton, CTRS        Group Therapy Note    Attendees: 20         Patient's Goal:  Find my clothes    Notes:  Minimally engaged during discussion on developing a self care plan. Pleasant and social sharing experiences. Status After Intervention:  Improved    Participation Level:  Active Listener and Interactive    Participation Quality: Appropriate and Attentive      Speech:  normal      Thought Process/Content: Logical      Affective Functioning: Flat      Mood: depressed      Level of consciousness:  Alert and Attentive      Response to Learning: Progressing to goal      Endings: None Reported    Modes of Intervention: Education, Support, Socialization, Exploration and Activity      Discipline Responsible: Psychoeducational Specialist      Signature:  Richard Lowry, [address]

## 2021-05-31 NOTE — PROGRESS NOTES
[address] NOTE     5/31/2021     Patient was seen and examined in person, Chart reviewed   Patient's case discussed with staff/team    Chief Complaint: \" I just need to be on the medications my regular doctor gave me. \"    Interim History: Patient seen in her room she is angry and irritable. She states that she does not need to be on any of the medications we gave her just once her regular doctor gives her. She states that she will attend groups because she is not on Adderall Mobile to concentrate. She is very focused on her medication she is focused on Adderall she does not want to take the Trileptal.  Irritable affect very poor insight and judgment      Appetite:  [x] Normal/Unchanged  [] Increased  [] Decreased      Sleep:       [x] Normal/Unchanged  [] Fair       [] Poor              Energy:    [x] Normal/Unchanged  [] Increased  [] Decreased        SI [] Present  [x] Absent    HI  []Present  [x] Absent     Aggression:  [] yes  [x] no    Patient is [x] able  [] unable to CONTRACT FOR SAFETY     PAST MEDICAL/PSYCHIATRIC HISTORY:   Past Medical History:   Diagnosis Date    ADD (attention deficit disorder)     Anemia     Depression     PCOS (polycystic ovarian syndrome)     Polycystic ovarian syndrome     Thyroid disorder        FAMILY/SOCIAL HISTORY:  History reviewed. No pertinent family history. Social History     Socioeconomic History    Marital status:      Spouse name: Not on file    Number of children: Not on file    Years of education: Not on file    Highest education level: Not on file   Occupational History    Not on file   Tobacco Use    Smoking status: Never Smoker    Smokeless tobacco: Never Used   Substance and Sexual Activity    Alcohol use: No     Comment: rarely    Drug use: No    Sexual activity: Yes     Partners: Male     Birth control/protection: I.U.D.    Other Topics Concern    Not on file   Social History Narrative    Not on file     Social Determinants of Health     Financial Resource Strain:     Difficulty of Paying Living Expenses:    Food Insecurity:     Worried About Running Out of Food in the Last Year:     [address] in the Last Year:    Transportation Needs:     Lack of Transportation (Medical):  Lack of Transportation (Non-Medical):    Physical Activity:     Days of Exercise per Week:     Minutes of Exercise per Session:    Stress:     Feeling of Stress :    Social Connections:     Frequency of Communication with Friends and Family:     Frequency of Social Gatherings with Friends and Family:     Attends Jain Services:     Active Member of Clubs or Organizations:     Attends Club or Organization Meetings:     Marital Status:    Intimate Partner Violence:     Fear of Current or Ex-Partner:     Emotionally Abused:     Physically Abused:     Sexually Abused:            ROS:  [x] All negative/unchanged except if checked.  Explain positive(checked items) below:  [] Constitutional  [] Eyes  [] Ear/Nose/Mouth/Throat  [] Respiratory  [] CV  [] GI  []   [] Musculoskeletal  [] Skin/Breast  [] Neurological  [] Endocrine  [] Heme/Lymph  [] Allergic/Immunologic    Explanation:     MEDICATIONS:    Current Facility-Administered Medications:     venlafaxine (EFFEXOR XR) extended release capsule 75 mg, 75 mg, Oral, Daily with breakfast, Lizeth Trimble MD, 75 mg at 05/30/21 1359    busPIRone (BUSPAR) tablet 15 mg, 15 mg, Oral, BID, Lizeth Trimble MD, 15 mg at 05/30/21 2131    ARIPiprazole (ABILIFY) tablet 15 mg, 15 mg, Oral, Daily, Lizeth Trimble MD, 15 mg at 05/30/21 1359    cyproheptadine (PERIACTIN) 4 MG tablet 4 mg, 4 mg, Oral, Nightly, Lizeth Trimble MD    levothyroxine (SYNTHROID) tablet 175 mcg, 175 mcg, Oral, Daily, Lizeth Trimble MD, 175 mcg at 05/31/21 4483    metFORMIN (GLUCOPHAGE) tablet 500 mg, 500 mg, Oral, BID WC, Lizeth Trimble MD, 500 mg at 05/30/21 1719    OXcarbazepine (TRILEPTAL) tablet 300 mg, 300 mg, Oral, BID, Jose Maria Neil MD    acetaminophen (TYLENOL) tablet 650 mg, 650 mg, Oral, U2S PRN, HAJA Arora CNP    magnesium hydroxide (MILK OF MAGNESIA) 400 MG/5ML suspension 30 mL, 30 mL, Oral, Daily PRN, HAJA Arora - CNP    aluminum & magnesium hydroxide-simethicone (MAALOX) 200-200-20 MG/5ML suspension 30 mL, 30 mL, Oral, PRN, Fatimah Lopez APRN - CNP    hydrOXYzine (ATARAX) tablet 50 mg, 50 mg, Oral, TID PRN, HAJA Arora - CNP    haloperidol lactate (HALDOL) injection 5 mg, 5 mg, Intramuscular, Q6H PRN **OR** haloperidol (HALDOL) tablet 5 mg, 5 mg, Oral, I1H PRN, HAJA Arora - CNP    traZODone (DESYREL) tablet 50 mg, 50 mg, Oral, Nightly PRN, HAJA Arora - VERENICE      Examination:  BP (!) 111/58   Pulse 82   Temp 98.2 °F (36.8 °C)   Resp 14   Ht 5' 5\" (1.651 m)   Wt 230 lb (104.3 kg)   SpO2 99%   BMI 38.27 kg/m²   Gait - steady  Medication side effects(SE): Denies    Mental Status Examination:    Level of consciousness:  within normal limits   Appearance:  fair grooming and fair hygiene  Behavior/Motor:  no abnormalities noted  Attitude toward examiner:  argumentative  Speech:  spontaneous, normal rate and normal volume   Mood: \" I am fine. \"  Affect: Mood incongruent irritable easily agitated  Thought processes: Linear without flight of ideas loose associations  Thought content: Devoid of auditory visual loose [place] delusions noted perception arise. Denies SI/HI intent or plan  Cognition:  oriented to person, place, and time   Concentration intact  Insight poor   Judgement poor     ASSESSMENT:   Patient symptoms are:  [] Well controlled  [] Improving  [] Worsening  [x] No change      Diagnosis: *  Active Problems:    Mood disorder ([address]  Resolved Problems:    * No resolved hospital problems.  *      LABS:    Recent Labs     05/29/21  0725   WBC 9.9   HGB 13.2   *     Recent Labs     05/29/21  0725      K 3.7   CL 103   CO2 24   BUN 7   CREATININE 0.7   GLUCOSE 132*     Recent Labs     05/29/21  0725   BILITOT 0.3   ALKPHOS 99   AST 9   ALT 8     Lab Results   Component Value Date    LABAMPH NOT DETECTED 05/29/2021    BARBSCNU NOT DETECTED 05/29/2021    LABBENZ NOT DETECTED 05/29/2021    CANNAB NOT DETECTED  08/04/2013    COCAINESCRN NOT DETECTED 08/04/2013    LABMETH NOT DETECTED 05/29/2021    OPIATESCREENURINE NOT DETECTED 05/29/2021    PHENCYCLIDINESCREENURINE NOT DETECTED 05/29/2021    ETOH <10 05/29/2021     Lab Results   Component Value Date    TSH 2.640 11/03/2020     No results found for: LITHIUM  No results found for: VALPROATE, CBMZ      Treatment Plan:  Reviewed current Medications with the patient. Risks, benefits, side effects, drug-to-drug interactions and alternatives to treatment were discussed. Collateral information:   CD evaluation  Encourage patient to attend group and other milieu activities.   Discharge planning discussed with the patient and treatment team.    Continue Abilify 15 mg daily  Continue BuSpar 15 mg twice daily  Continue Trileptal 300 mg twice daily for mood stabilization  Continue Effexor XR 75 mg daily    PSYCHOTHERAPY/COUNSELING:  [x] Therapeutic interview  [x] Supportive  [] CBT  [] Ongoing  [] Other    [x] Patient continues to need, on a daily basis, active treatment furnished directly by or requiring the supervision of inpatient psychiatric personnel      Anticipated Length of stay: 3 to 7 days based on stability            Electronically signed by HAJA Spaulding CNP on 6/99/7263 at 8:37 AM

## 2021-05-31 NOTE — PROGRESS NOTES
PT. REPORTS WILL CONTINUE TO REFUSE TRILEPTAL. MOOD IRRITABLE AND DEFENSIVE WHEN ASSESSED THIS A.M., BUT REMAINS IN CONTROL AND MAKES NEEDS KNOWN APPROPRIATELY. DENIES SUICIDAL IDEATIONS AND NO SELF HARM BEHAVIORS REPORTED OR OBSERVED. GROUPS ENCOURAGED. IN CONTROL WITH NO UNIT PROBLEMS. UP FOR MEAL, BUT SPENDS MOST TIME IN BED READING.

## 2021-05-31 NOTE — PLAN OF CARE
5 [facility]  Day 3 Interdisciplinary Treatment Plan NOTE    Review Date & Time: 5/31/21 1000    Patient was in treatment team    Admission Type:   Admission Type: Involuntary    Reason for admission:  Reason for Admission: \"My suicidal ideations are out of control. \"  Estimated Length of Stay Update:   5 DAYS  Estimated Discharge Date Update:  WEDNESDAY    PATIENT STRENGTHS:  Patient Strengths Strengths: Social Skills, Connection to output provider, Positive Support  Patient Strengths and Limitations:Limitations: Multiple barriers to leisure interests, Tendency to isolate self, Difficulty problem solving/relies on others to help solve problems  Addictive Behavior:Addictive Behavior  In the past 3 months, have you felt or has someone told you that you have a problem with:  : Eating (too much/too little)  Do you have a history of Chemical Use?: No  Do you have a history of Alcohol Use?: No  Do you have a history of Street Drug Abuse?: No  Histroy of Prescripton Drug Abuse?: No  Medical Problems:  Past Medical History:   Diagnosis Date    ADD (attention deficit disorder)     Anemia     Depression     PCOS (polycystic ovarian syndrome)     Polycystic ovarian syndrome     Thyroid disorder        Risk:  Fall RiskTotal: 73  Gualberto Scale Gualberto Scale Score: 22  BVC Total: 0  Change in scores: NO FALLS OR GUALBERTO RISK IDENTIFIED THIS SHIFT.  Changes to plan of Care : CONTINUE TO ASSESS FOR ANY INCREASE IN RISK OR CHANGE IN STATUS    Status EXAM:   Status and Exam  Normal: Yes (Resting in bed apparently asleep)  Facial Expression: Flat (Brightens with conversation)  Affect: Congruent  Level of Consciousness: Alert  Mood:Normal: No  Mood:  (Denies)  Motor Activity:Normal: No  Motor Activity: Decreased  Interview Behavior: Cooperative  Preception: [place] to Person, [place] to Time, [place] to Place  Attention:Normal: No  Attention: Distractible  Thought Processes: Circumstantial  Thought Content:Normal: No Thought Content: Preoccupations  Hallucinations: None  Delusions: No  Memory:Normal: No  Memory: Poor Recent  Insight and Judgment: No  Insight and Judgment: Poor Insight, Poor Judgment  Present Suicidal Ideation: No  Present Homicidal Ideation: No    Daily Assessment Last Entry:   Daily Sleep (WDL): Within Defined Limits         Patient Currently in Pain: No  Daily Nutrition (WDL): Within Defined Limits    Patient Monitoring:  Frequency of Checks: 4 times per hour, close    Psychiatric Symptoms:   Depression Symptoms  Depression Symptoms: Appetite change, Change in energy level, Crying, Loss of interest, Isolative  Anxiety Symptoms  Anxiety Symptoms: Generalized  [place] Symptoms  [place] Symptoms: Pressured speech     Psychosis Symptoms  Delusion Type: No problems reported or observed. Suicide Risk CSSR-S:  1) Within the past month, have you wished you were dead or wished you could go to sleep and not wake up? : Yes  2) Have you actually had any thoughts of killing yourself? : Yes  3) Have you been thinking about how you might kill yourself? : Yes  5) Have you started to work out or worked out the details of how to kill yourself?  Do you intend to carry out this plan? : Yes  6) Have you ever done anything, started to do anything, or prepared to do anything to end your life?: Yes  Change in Result: PT. DENIES SUICIDAL IDEATIONS Change in Plan of care: [address]:   Learner Progress Toward Treatment Goals: Reviewed results and recommendations of this team    Method: Small group    Outcome: Needs reinforcement    PATIENT GOALS: NONE REPORTED    PLAN/TREATMENT RECOMMENDATIONS UPDATE: ENCOURAGE GROUPS AND TO BE OUT OF ROOM, ENCOURAGE MEDICATION COMPLIANCE AND PROVIDE SUPPORTIVE CARE, ASSESS RESPONSE TO TREATMENT, DISCHARGE PLANNING AND FOLLOW UP    GOALS UPDATE:   Time frame for Short-Term Goals:  5 DAYS      Jacinda Cunha RN

## 2021-05-31 NOTE — PROGRESS NOTES
Patient A+Ox 4, denies SI/HI, and AH/VH. Denies both anxiety and depression. Flat affect, calm, sad expression on face, however brightens with conversation. Responds appropriately when conversing Patient observed out on the unit watching television.  No behavioral issues observed

## 2021-05-31 NOTE — PLAN OF CARE
Problem: Depressive Behavior With or Without Suicide Precautions:  Goal: Ability to disclose and discuss suicidal ideas will improve  Description: Ability to disclose and discuss suicidal ideas will improve  5/31/2021 0945 by Ca Montiel RN  Outcome: Met This Shift  PT. DENIES SUICIDAL IDEATIONS.   5/31/2021 0515 by Isa Gamble RN  Outcome: Ongoing  5/30/2021 2140 by Cielo Noel RN  Outcome: Ongoing     Problem: Altered Mood, Deterioration in Function:  Goal: Able to verbalize reality based thinking  Description: Able to verbalize reality based thinking  5/31/2021 0515 by Isa Gamble RN  Outcome: Ongoing  NO VOICED DELUSIONS ON A.M. ASSESSMENT.    5/30/2021 2140 by Cielo Noel RN  Outcome: Ongoing

## 2021-05-31 NOTE — PLAN OF CARE
Problem: Depressive Behavior With or Without Suicide Precautions:  Goal: Able to verbalize acceptance of life and situations over which he or she has no control  Description: Able to verbalize acceptance of life and situations over which he or she has no control  5/30/2021 2140 by Keira Major RN  Outcome: Ongoing  5/30/2021 0937 by Romayne Ream, RN  Outcome: Ongoing     Problem: Depressive Behavior With or Without Suicide Precautions:  Goal: Able to verbalize and/or display a decrease in depressive symptoms  Description: Able to verbalize and/or display a decrease in depressive symptoms  5/30/2021 2140 by Keira Major RN  Outcome: Ongoing  5/30/2021 0937 by Romayne Ream, RN  Outcome: Ongoing     Problem: Depressive Behavior With or Without Suicide Precautions:  Goal: Able to verbalize support systems  Description: Able to verbalize support systems  5/30/2021 2140 by Keira Major RN  Outcome: Ongoing  5/30/2021 0937 by Romayne Ream, RN  Outcome: Ongoing

## 2021-06-01 PROCEDURE — 6370000000 HC RX 637 (ALT 250 FOR IP): Performed by: PSYCHIATRY & NEUROLOGY

## 2021-06-01 PROCEDURE — 99232 SBSQ HOSP IP/OBS MODERATE 35: CPT | Performed by: NURSE PRACTITIONER

## 2021-06-01 PROCEDURE — 1240000000 HC EMOTIONAL WELLNESS R&B

## 2021-06-01 PROCEDURE — 6370000000 HC RX 637 (ALT 250 FOR IP): Performed by: NURSE PRACTITIONER

## 2021-06-01 RX ADMIN — LEVOTHYROXINE SODIUM 100 MCG: 0.1 TABLET ORAL at 06:40

## 2021-06-01 RX ADMIN — VENLAFAXINE HYDROCHLORIDE 75 MG: 75 CAPSULE, EXTENDED RELEASE ORAL at 09:46

## 2021-06-01 RX ADMIN — BUSPIRONE HYDROCHLORIDE 15 MG: 5 TABLET ORAL at 21:08

## 2021-06-01 RX ADMIN — ARIPIPRAZOLE 15 MG: 15 TABLET ORAL at 09:46

## 2021-06-01 RX ADMIN — BUSPIRONE HYDROCHLORIDE 15 MG: 5 TABLET ORAL at 09:46

## 2021-06-01 RX ADMIN — METFORMIN HYDROCHLORIDE 500 MG: 500 TABLET ORAL at 16:21

## 2021-06-01 RX ADMIN — METFORMIN HYDROCHLORIDE 500 MG: 500 TABLET ORAL at 09:46

## 2021-06-01 ASSESSMENT — PAIN SCALES - GENERAL: PAINLEVEL_OUTOF10: 0

## 2021-06-01 NOTE — PROGRESS NOTES
Attended morning community meeting. Updated on staffing and daily expectations. Shared goal for the day as to focus on activities.

## 2021-06-01 NOTE — CARE COORDINATION
sw received a message from pt therapist Radha Aleman at [facility]. Pt apt was moved from 6/17 to 6/9.

## 2021-06-01 NOTE — PROGRESS NOTES
Attended afternoon meet and greet. Updated on staffing and evening expectations. Pleasant and engaged in group. Participated in summer trivia. Patient 1 of 12 in attendance.

## 2021-06-01 NOTE — PROGRESS NOTES
BEHAVIORAL HEALTH FOLLOW-UP NOTE     6/1/2021     Patient was seen and examined in person, Chart reviewed   Patient's case discussed with staff/team    Chief Complaint: \" I was not feeling good yesterday because my Synthroid was messed up. \"\"    Interim History: Patient is out in the milieu now she has been attending some groups social select peers. She denies SI/HI intent or plan she denies any auditory visual hallucinations. She states the medications are working well for her. She is very discharge focused states she does not go back and live with her boyfriend. Affect is slightly irritable but more pleasant she is out of her room more social in the milieu. Eating well sleeping well no neurovegetative signs of depression      Appetite:  [x] Normal/Unchanged  [] Increased  [] Decreased      Sleep:       [x] Normal/Unchanged  [] Fair       [] Poor              Energy:    [x] Normal/Unchanged  [] Increased  [] Decreased        SI [] Present  [x] Absent    HI  []Present  [x] Absent     Aggression:  [] yes  [x] no    Patient is [x] able  [] unable to CONTRACT FOR SAFETY     PAST MEDICAL/PSYCHIATRIC HISTORY:   Past Medical History:   Diagnosis Date    ADD (attention deficit disorder)     Anemia     Depression     PCOS (polycystic ovarian syndrome)     Polycystic ovarian syndrome     Thyroid disorder        FAMILY/SOCIAL HISTORY:  History reviewed. No pertinent family history. Social History     Socioeconomic History    Marital status:      Spouse name: Not on file    Number of children: Not on file    Years of education: Not on file    Highest education level: Not on file   Occupational History    Not on file   Tobacco Use    Smoking status: Never Smoker    Smokeless tobacco: Never Used   Substance and Sexual Activity    Alcohol use: No     Comment: rarely    Drug use: No    Sexual activity: Yes     Partners: Male     Birth control/protection: I.U.D.    Other Topics Concern    Not on file Social History Narrative    Not on file     Social Determinants of Health     Financial Resource Strain:     Difficulty of Paying Living Expenses:    Food Insecurity:     Worried About Running Out of Food in the Last Year:     [address] in the Last Year:    Transportation Needs:     Lack of Transportation (Medical):  Lack of Transportation (Non-Medical):    Physical Activity:     Days of Exercise per Week:     Minutes of Exercise per Session:    Stress:     Feeling of Stress :    Social Connections:     Frequency of Communication with Friends and Family:     Frequency of Social Gatherings with Friends and Family:     Attends [employer] Services:     Active Member of Clubs or Organizations:     Attends Club or Organization Meetings:     Marital Status:    Intimate Partner Violence:     Fear of Current or Ex-Partner:     Emotionally Abused:     Physically Abused:     Sexually Abused:            ROS:  [x] All negative/unchanged except if checked.  Explain positive(checked items) below:  [] Constitutional  [] Eyes  [] Ear/Nose/Mouth/Throat  [] Respiratory  [] CV  [] GI  []   [] Musculoskeletal  [] Skin/Breast  [] Neurological  [] Endocrine  [] Heme/Lymph  [] Allergic/Immunologic    Explanation:     MEDICATIONS:    Current Facility-Administered Medications:     metFORMIN (GLUCOPHAGE) tablet 500 mg, 500 mg, Oral, BID WC, Porsha Lopez APRN - CNP, 681 mg at 05/31/21 1640    levothyroxine (SYNTHROID) tablet 100 mcg, 100 mcg, Oral, Daily, HAJA Pradhan - CNP, 426 mcg at 06/01/21 0640    venlafaxine (EFFEXOR XR) extended release capsule 75 mg, 75 mg, Oral, Daily with breakfast, Sera Brito MD, 75 mg at 05/31/21 0942    busPIRone (BUSPAR) tablet 15 mg, 15 mg, Oral, BID, Sera Brito MD, 15 mg at 05/31/21 2206    ARIPiprazole (ABILIFY) tablet 15 mg, 15 mg, Oral, Daily, Sera Brito MD, 15 mg at 05/31/21 0941    cyproheptadine (PERIACTIN) 4 MG tablet 4 mg, 4 mg, Oral, Nightly, Danielle Guerra MD    OXcarbazepine (TRILEPTAL) tablet 300 mg, 300 mg, Oral, BID, Danielle Guerra MD    acetaminophen (TYLENOL) tablet 650 mg, 650 mg, Oral, U9V PRN, HAJA Child - CNP    magnesium hydroxide (MILK OF MAGNESIA) 400 MG/5ML suspension 30 mL, 30 mL, Oral, Daily PRN, HAJA Child - CNP    aluminum & magnesium hydroxide-simethicone (MAALOX) 200-200-20 MG/5ML suspension 30 mL, 30 mL, Oral, PRN, HAJA Child - CNP    hydrOXYzine (ATARAX) tablet 50 mg, 50 mg, Oral, TID PRN, HAJA Child - CNP    haloperidol lactate (HALDOL) injection 5 mg, 5 mg, Intramuscular, Q6H PRN **OR** haloperidol (HALDOL) tablet 5 mg, 5 mg, Oral, J0H PRN, HAJA Child - CNP    traZODone (DESYREL) tablet 50 mg, 50 mg, Oral, Nightly PRN, HAJA Child - VERENICE      Examination:  BP (!) 112/55   Pulse 82   Temp 98.7 °F (37.1 °C)   Resp 14   Ht 5' 5\" (1.651 m)   Wt 230 lb (104.3 kg)   SpO2 99%   BMI 38.27 kg/m²   Gait - steady  Medication side effects(SE): Denies    Mental Status Examination:    Level of consciousness:  within normal limits   Appearance:  fair grooming and fair hygiene  Behavior/Motor:  no abnormalities noted  Attitude toward examiner:  argumentative  Speech:  spontaneous, normal rate and normal volume   Mood: \" I am fine. \"  Affect: Mood incongruent irritable easily agitated  Thought processes: Linear without flight of ideas loose associations  Thought content: Devoid of auditory visual loose Nations delusions noted perception arise. Denies SI/HI intent or plan  Cognition:  oriented to person, place, and time   Concentration intact  Insight poor   Judgement poor     ASSESSMENT:   Patient symptoms are:  [] Well controlled  [] Improving  [] Worsening  [x] No change      Diagnosis: *  Principal Problem:    Mixed bipolar II disorder ([address]  Active Problems:    Cocaine abuse ([facility]  Resolved Problems:    * No resolved hospital problems.  *      LABS:    No results for input(s): WBC, HGB, PLT in the last 72 hours. No results for input(s): NA, K, CL, CO2, BUN, CREATININE, GLUCOSE in the last 72 hours. No results for input(s): BILITOT, ALKPHOS, AST, ALT in the last 72 hours. Lab Results   Component Value Date    LABAMPH NOT DETECTED 05/29/2021    BARBSCNU NOT DETECTED 05/29/2021    LABBENZ NOT DETECTED 05/29/2021    CANNAB NOT DETECTED  08/04/2013    COCAINESCRN NOT DETECTED 08/04/2013    LABMETH NOT DETECTED 05/29/2021    OPIATESCREENURINE NOT DETECTED 05/29/2021    PHENCYCLIDINESCREENURINE NOT DETECTED 05/29/2021    ETOH <10 05/29/2021     Lab Results   Component Value Date    TSH 2.550 05/31/2021     No results found for: LITHIUM  No results found for: VALPROATE, CBMZ      Treatment Plan:  Reviewed current Medications with the patient. Risks, benefits, side effects, drug-to-drug interactions and alternatives to treatment were discussed. Collateral information:   CD evaluation  Encourage patient to attend group and other milieu activities.   Discharge planning discussed with the patient and treatment team.    Continue Abilify 15 mg daily  Continue BuSpar 15 mg twice daily  Continue Trileptal 300 mg twice daily for mood stabilization  Continue Effexor XR 75 mg daily    PSYCHOTHERAPY/COUNSELING:  [x] Therapeutic interview  [x] Supportive  [] CBT  [] Ongoing  [] Other    [x] Patient continues to need, on a daily basis, active treatment furnished directly by or requiring the supervision of inpatient psychiatric personnel      Anticipated Length of stay: 3 to 7 days based on stability            Electronically signed by HAJA Rosas CNP on 6/3/8918 at 9:12 AM

## 2021-06-01 NOTE — GROUP NOTE
Group Therapy Note    Date: 6/1/2021    Group Start Time: 1000  Group End Time: 2701  Group Topic: Psychoeducation    [facility]    Kellie Ruiz, CTRS        Group Therapy Note    Number of participants: 17  Type of group: Psychoeducation  Mode of intervention: Education, Support, Socialization, Exploration, Clarifying, and Problem-solving  Topic: Positivity Shield  Objective: Pt will identify 3 things to help stay positive in recovery. Notes:  Pt was interactive during group sharing 3 things to help stay positive in recovery. Pt gave support and feedback to others. Enjoyed positive shield activity. Status After Intervention:  Improved    Participation Level:  Active Listener and Interactive    Participation Quality: Appropriate, Attentive, Sharing and Supportive      Speech:  normal      Thought Process/Content: Logical      Affective Functioning: Congruent      Mood: euthymic      Level of consciousness:  Alert, Oriented x4 and Attentive      Response to Learning: Able to verbalize current knowledge/experience, Able to verbalize/acknowledge new learning, Able to retain information, Capable of insight, Able to change behavior and Progressing to goal      Endings: None Reported    Modes of Intervention: Education, Support, Socialization, Exploration, Clarifying, Problem-solving and Activity

## 2021-06-01 NOTE — PLAN OF CARE
Patient is pleasant and cooperative and does not present with any irritability. Patient judgement is poor but is slowly improving. No behavioral issues. Patient reports that she feels better. Her anxiety and depression is improving. She denies SI, HI, and AVH. Patient is free from any delusions. She is medication complaint except her trileptal. Will monitor closely.

## 2021-06-01 NOTE — PLAN OF CARE
Problem: Depressive Behavior With or Without Suicide Precautions:  Goal: Able to verbalize acceptance of life and situations over which he or she has no control  Description: Able to verbalize acceptance of life and situations over which he or she has no control  Outcome: Ongoing  Goal: Able to verbalize and/or display a decrease in depressive symptoms  Description: Able to verbalize and/or display a decrease in depressive symptoms  5/31/2021 2220 by Mani Martinez RN  Outcome: Ongoing  5/31/2021 0945 by Jyoti Vela RN  Outcome: Ongoing  Goal: Ability to disclose and discuss suicidal ideas will improve  Description: Ability to disclose and discuss suicidal ideas will improve  5/31/2021 2220 by Mani Martinez RN  Outcome: Met This Shift  5/31/2021 0945 by Jyoti Vela RN  Outcome: Met This Shift     Problem: Falls - Risk of:  Goal: Will remain free from falls  Description: Will remain free from falls  Outcome: Met This Shift    PT denies suicidal ideations, homicidal ideations and hallucinations. Pt is refusing select medications wanting only the medications her doctor prescribes. PT out on the unit after snack. PT stated she was still \"Not feeling good and a bit dizzy sitting up too quickly. \" Pt was educated on correct procedure to move from laying to sitting if dizzy. Pt educated on location of call light in the room. Pt stated she did hold down her dinner. Pt presents sad and depressed. Evasive. Will continue to monitor.

## 2021-06-02 VITALS
WEIGHT: 230 LBS | HEART RATE: 78 BPM | TEMPERATURE: 98.7 F | OXYGEN SATURATION: 99 % | HEIGHT: 65 IN | RESPIRATION RATE: 14 BRPM | BODY MASS INDEX: 38.32 KG/M2 | SYSTOLIC BLOOD PRESSURE: 94 MMHG | DIASTOLIC BLOOD PRESSURE: 51 MMHG

## 2021-06-02 PROCEDURE — 6370000000 HC RX 637 (ALT 250 FOR IP): Performed by: NURSE PRACTITIONER

## 2021-06-02 PROCEDURE — 99239 HOSP IP/OBS DSCHRG MGMT >30: CPT | Performed by: NURSE PRACTITIONER

## 2021-06-02 PROCEDURE — 6370000000 HC RX 637 (ALT 250 FOR IP): Performed by: PSYCHIATRY & NEUROLOGY

## 2021-06-02 RX ORDER — OXCARBAZEPINE 300 MG/1
300 TABLET, FILM COATED ORAL 2 TIMES DAILY
Qty: 60 TABLET | Refills: 0 | Status: SHIPPED | OUTPATIENT
Start: 2021-06-02 | End: 2021-07-02

## 2021-06-02 RX ORDER — VENLAFAXINE HYDROCHLORIDE 75 MG/1
75 CAPSULE, EXTENDED RELEASE ORAL
Qty: 30 CAPSULE | Refills: 0 | Status: SHIPPED | OUTPATIENT
Start: 2021-06-03 | End: 2021-07-03

## 2021-06-02 RX ADMIN — LEVOTHYROXINE SODIUM 100 MCG: 0.1 TABLET ORAL at 06:37

## 2021-06-02 RX ADMIN — ARIPIPRAZOLE 15 MG: 15 TABLET ORAL at 09:26

## 2021-06-02 RX ADMIN — VENLAFAXINE HYDROCHLORIDE 75 MG: 75 CAPSULE, EXTENDED RELEASE ORAL at 09:26

## 2021-06-02 RX ADMIN — BUSPIRONE HYDROCHLORIDE 15 MG: 5 TABLET ORAL at 09:26

## 2021-06-02 RX ADMIN — METFORMIN HYDROCHLORIDE 500 MG: 500 TABLET ORAL at 09:26

## 2021-06-02 ASSESSMENT — PAIN SCALES - GENERAL: PAINLEVEL_OUTOF10: 0

## 2021-06-02 NOTE — PROGRESS NOTES
Patient wants medications transferred to outpatient pharmacy, that being [facility] [address], telephone: [phone].  [address] has updated this and [facility]

## 2021-06-02 NOTE — PLAN OF CARE
Patient is pleasant and cooperative, social with peers, and present at groups. Patient is in good control. Patient makes needs known. She does present anxious but she reports it is significantly improved. Patient denies depression. She denies SI, HI, and AVH. Patient is free from any delusions and free from any paranoia. Patient judgement and insight is improved, will monitor closely.

## 2021-06-02 NOTE — DISCHARGE SUMMARY
 Feeling of Stress :    Social Connections:     Frequency of Communication with Friends and Family:     Frequency of Social Gatherings with Friends and Family:     Attends Anabaptist Services:     Active Member of Clubs or Organizations:     Attends Club or Organization Meetings:     Marital Status:    Intimate Partner Violence:     Fear of Current or Ex-Partner:     Emotionally Abused:     Physically Abused:     Sexually Abused:        MEDICATIONS:    Current Facility-Administered Medications:     metFORMIN (GLUCOPHAGE) tablet 500 mg, 500 mg, Oral, BID WC, HAJA Barba CNP, 611 mg at 06/02/21 8125    levothyroxine (SYNTHROID) tablet 100 mcg, 100 mcg, Oral, Daily, Alethea Galeazzi, APRN - CNP, 232 mcg at 06/02/21 7554    venlafaxine (EFFEXOR XR) extended release capsule 75 mg, 75 mg, Oral, Daily with breakfast, Marie Durán MD, 75 mg at 06/02/21 0926    busPIRone (BUSPAR) tablet 15 mg, 15 mg, Oral, BID, Marie Durán MD, 15 mg at 06/02/21 0926    ARIPiprazole (ABILIFY) tablet 15 mg, 15 mg, Oral, Daily, Marie Durán MD, 15 mg at 06/02/21 0926    cyproheptadine (PERIACTIN) 4 MG tablet 4 mg, 4 mg, Oral, Nightly, Marie Durán MD    OXcarbazepine (TRILEPTAL) tablet 300 mg, 300 mg, Oral, BID, Marie Durán MD    acetaminophen (TYLENOL) tablet 650 mg, 650 mg, Oral, V6P PRN, HAJA Barba - CNP    magnesium hydroxide (MILK OF MAGNESIA) 400 MG/5ML suspension 30 mL, 30 mL, Oral, Daily PRN, Talisha Lopez, HAJA - CNP    aluminum & magnesium hydroxide-simethicone (MAALOX) 200-200-20 MG/5ML suspension 30 mL, 30 mL, Oral, PRN, Talisha Lopez, APRN - CNP    hydrOXYzine (ATARAX) tablet 50 mg, 50 mg, Oral, TID PRN, HAJA Barba - CNP    haloperidol lactate (HALDOL) injection 5 mg, 5 mg, Intramuscular, Q6H PRN **OR** haloperidol (HALDOL) tablet 5 mg, 5 mg, Oral, B2K PRN, Talisha Lopez, APRN - CNP    traZODone (DESYREL) tablet 50 mg, 50 mg, Oral, Nightly PRN, Bridger Miller, APRN - CNP    Current Outpatient Medications:     [START ON 6/3/2021] venlafaxine (EFFEXOR XR) 75 MG extended release capsule, Take 1 capsule by mouth daily (with breakfast), Disp: 30 capsule, Rfl: 0    OXcarbazepine (TRILEPTAL) 300 MG tablet, Take 1 tablet by mouth 2 times daily, Disp: 60 tablet, Rfl: 0    busPIRone (BUSPAR) 15 MG tablet, TK 1 T PO BID, Disp: , Rfl:     ARIPiprazole (ABILIFY) 15 MG tablet, Take 15 mg by mouth daily, Disp: , Rfl:     metformin (GLUCOPHAGE) 500 MG tablet, Take 500 mg by mouth 2 times daily (with meals). Taking for ovarian polycystic disease, Disp: , Rfl:     levothyroxine (SYNTHROID) 175 MCG tablet, Take 100 mcg by mouth Daily , Disp: , Rfl:     Examination:  BP (!) 94/51   Pulse 78   Temp 98.7 °F (37.1 °C) (Temporal)   Resp 14   Ht 5' 5\" (1.651 m)   Wt 230 lb (104.3 kg)   SpO2 99%   BMI 38.27 kg/m²   Gait - steady    HOSPITAL COURSE[de-identified]  Patient was admitted to the unit on 5/29/2021 was closely monitored for suicidal ideations. She was evaluated and treated with BuSpar 15 mg daily, Effexor XR 75 mg daily Abilify 15 mg daily and Trileptal 300 mg twice daily. .  Medical events were insignificant and patient continued to improve on the floor. She started coming out of her room she was attending groups to socializing with peers. She never made any suicidal statements or any suicidal gestures while in the unit. Social workers obtain confirmation patient's son who was able to voice any concerns that he had. He reported no safety concerns no access to any guns. Treatment team felt the patient obtain the maximum benefit for her hospitalization She was set up with an outpatient mental health agency for outpatient follow-up services . At the time of discharge patient  did not show impulsive behavior. She was up on the unit she was attending groups and socializing with peers. She vehemently denied any suicidal homicidal ideations intent or plan.   She was eating well and sleeping well there are no neurovegetative signs or symptoms of depression she denied any auditory visualizations. There are no overt or covert signs psychosis. She was appreciative of the help that she received here. This patient no longer meets criteria for inpatient hospitalization. No AVH or paranoid thoughts  No hopeless or worthless feeling  No active SI/HI  Appetite:  [x] Normal  [] Increased  [] Decreased    Sleep:       [x] Normal  [] Fair       [] Poor            Energy:    [x] Normal  [] Increased  [] Decreased     SI [] Present  [x] Absent  HI  []Present  [x] Absent   Aggression:  [] yes  [x] no  Patient is [x] able  [] unable to CONTRACT FOR SAFETY   Medication side effects(SE):  [x] None(Psych. Meds.) [] Other      Mental Status Examination on discharge:    Level of consciousness:  within normal limits   Appearance:  well-appearing  Behavior/Motor:  no abnormalities noted  Attitude toward examiner:  attentive and good eye contact  Speech:  spontaneous, normal rate and normal volume   Mood: \" My mood is good. \"  Affect: Appropriate and pleasant  Thought processes: Linear without flight of ideas loose associations  Thought content: Devoid any auditory visualizations delusions or perceptual abnormalities.   Denies SI/HI intent or plan  Cognition:  oriented to person, place, and time   Concentration intact  Memory intact  Insight good   Judgement fair   Fund of Knowledge adequate      ASSESSMENT:  Patient symptoms are:  [x] Well controlled  [x] Improving  [] Worsening  [] No change    Reason for more than one antipsychotic:  [x] N/A  [] 3 Failed Monotherapy attempts (Drugs tried:)  [] Crossover to a new antipsychotic  [] Taper to Monotherapy from Polypharmacy  [] Augmentation of clozapine therapy due to treatment resistance to single therapy    Diagnosis:  Principal Problem:    Mixed bipolar II disorder ([facility]  Active Problems:    Cocaine abuse ([facility]  Resolved Problems:    * No resolved hospital problems. *      LABS:    No results for input(s): WBC, HGB, PLT in the last 72 hours. No results for input(s): NA, K, CL, CO2, BUN, CREATININE, GLUCOSE in the last 72 hours. No results for input(s): BILITOT, ALKPHOS, AST, ALT in the last 72 hours. Lab Results   Component Value Date    LABAMPH NOT DETECTED 05/29/2021    BARBSCNU NOT DETECTED 05/29/2021    LABBENZ NOT DETECTED 05/29/2021    CANNAB NOT DETECTED  08/04/2013    COCAINESCRN NOT DETECTED 08/04/2013    LABMETH NOT DETECTED 05/29/2021    OPIATESCREENURINE NOT DETECTED 05/29/2021    PHENCYCLIDINESCREENURINE NOT DETECTED 05/29/2021    ETOH <10 05/29/2021     Lab Results   Component Value Date    TSH 2.550 05/31/2021     No results found for: LITHIUM  No results found for: VALPROATE, CBMZ    RISK ASSESSMENT AT DISCHARGE: Low risk for suicide and homicide. Treatment Plan:  Reviewed current Medications with the patient. Education provided on the complaince with treatment. Risks, benefits, side effects, drug-to-drug interactions and alternatives to treatment were discussed. Encourage patient to attend outpatient follow up appointment and therapy. Patient was advised to call the outpatient provider, visit the nearest ED or call 911 if symptoms are not manageable. Patient's family member was contacted prior to the discharge. Medication List      START taking these medications    OXcarbazepine 300 MG tablet  Commonly known as: TRILEPTAL  Take 1 tablet by mouth 2 times daily        CHANGE how you take these medications    venlafaxine 75 MG extended release capsule  Commonly known as: EFFEXOR XR  Take 1 capsule by mouth daily (with breakfast)  Start taking on: [place] 3, 2021  What changed:   · medication strength  · See the new instructions. · Another medication with the same name was removed. Continue taking this medication, and follow the directions you see here.         CONTINUE taking these medications    ARIPiprazole 15 MG tablet  Commonly known as: ABILIFY     busPIRone 15 MG tablet  Commonly known as: BUSPAR     metFORMIN 500 MG tablet  Commonly known as: GLUCOPHAGE     Synthroid 175 MCG tablet  Generic drug: levothyroxine        STOP taking these medications    Adderall XR 30 MG extended release capsule  Generic drug: amphetamine-dextroamphetamine           Where to Get Your Medications      These medications were sent to [facility] [address] [address]    Phone: [phone]   · OXcarbazepine 300 MG tablet  · venlafaxine 75 MG extended release capsule       Patient is counseled if she continues to abuse drugs or alcohol she could act out impulsively causing serious harm to herself or others even though it may be unintentional.  She demonstrated understanding of this and has the capacity understand this    Patient is counseled her mental health treatment will be difficult to optimize with ongoing use of drugs or alcohol she demonstrated understanding of this and has the capacity to understand this     Patient is counseled that if she uses any amount of opiates after any clean time she could have an unintentional overdose she demonstrated understanding standing of this and has  the capacity understand this    Patient is counseled she must remain compliant with all medications outpatient follow-up appointments    Patient is discharged home in stable condition      TIME SPEND - 35 MINUTES TO COMPLETE THE EVALUATION, DISCHARGE SUMMARY, MEDICATION RECONCILIATION AND FOLLOW UP CARE     Signed:  HAJA Rosas CNP  2/0/5628  2:59 PM

## 2021-06-02 NOTE — GROUP NOTE
Group Therapy Note    Date: 6/2/2021    Group Start Time: 1000  Group End Time: 0011  Group Topic: Psychoeducation    [facility]    Kellie Ruiz, CTRS        Group Therapy Note    Number of participants: 17  Type of group: Psychoeducation  Mode of intervention: Education, Support, Socialization, Exploration, Clarifying, Problem-solving, and Activity  Topic: Self Management Skills  Objective: Pt will identify 3 ways to regulate and control their actions, feelings, and thoughts in recovery. Patient's Goal:  \"Work on attention span\"     Notes:  Pt was interactive during group sharing 3 ways to regulate and control actions, feelings, and thoughts in recovery. Pt gave support and feedback to others. Enjoyed self management activity questions. Status After Intervention:  Improved    Participation Level:  Active Listener and Interactive    Participation Quality: Appropriate, Attentive, Sharing and Supportive      Speech:  normal      Thought Process/Content: Logical      Affective Functioning: Congruent      Mood: euthymic      Level of consciousness:  Alert, Oriented x4 and Attentive      Response to Learning: Able to verbalize current knowledge/experience, Able to verbalize/acknowledge new learning, Able to retain information, Capable of insight, Able to change behavior and Progressing to goal      Endings: None Reported    Modes of Intervention: Education, Support, Socialization, Exploration, Clarifying, Problem-solving and Activity

## 2021-06-02 NOTE — CARE COORDINATION
MEHRDAD note: d/c planning: MEHRDAD spoke with son, Dany Porras [phone]. He states that he is fairly close with pt and he has no safety concerns for her being d/c today. He doesn't think there are any guns in the home as pt doesn't like guns to begin with. Per pt, she states that there is something wrong with pts phone and that is why we weren't able to reach him yesterday.

## 2021-06-02 NOTE — SUICIDE SAFETY PLAN
SAFETY PLAN    A suicide Safety Plan is a document that supports someone when they are having thoughts of suicide. Warning Signs that indicate a suicidal crisis may be developing: What (situations, thoughts, feelings, body sensations, behaviors, etc.) do you experience that lets you know you are beginning to think about suicide? 1. irritbility  2. Being quiet  3. Being isolative    Internal Coping Strategies:  What things can I do (relaxation techniques, hobbies, physical activities, etc.) to take my mind off my problems without contacting another person? 1. Music  2. Reading  3. Being with family    People and social settings that provide distraction: Who can I call or where can I go to distract me? 1. Name: Ange Cutler  Phone: [phone]  2. Name:   Phone:    3. Place:            4. Place:     People whom I can ask for help: Who can I call when I need help - for example, friends, family, clergy, someone else? 1. Name: Primo Martinez          Phone:   2. Name:   Phone:   3. Name:   Phone:     Professionals or [address] [place] [address] I can contact during a crisis: Who can I call for help - for example, my doctor, my psychiatrist, my psychologist, a mental health provider, a suicide hotline? 1. Clinician Name:    Phone:       Clinician Pager or Emergency Contact #:     2. Clinician Name:   Phone:       Clinician Pager or Emergency Contact #:    3. Suicide Prevention Lifeline: 0-027-033-TALK (2450)    4. [address] [place] [address] Emergency Services -  for example, [facility] suicide hotline, [facility] Hotline:       Emergency Services Address:       Emergency Services Phone:    Making the environment safe: How can I make my environment (house/apartment/living space) safer? For example, can I remove guns, medications, and other items? 1. Keep medicine locked up  2.  Santo Habermann with my belongings

## 2021-06-03 ENCOUNTER — TELEPHONE (OUTPATIENT)
Dept: PRIMARY CARE CLINIC | Age: 51
End: 2021-06-03

## 2021-06-03 NOTE — CARE COORDINATION
In order to ensure appropriate transition and discharge planning is in place, the following documents have been transmitted to [facility], as the new outpatient provider:     The d/c diagnosis was transmitted to the next care provider   The reason for hospitalization was transmitted to the next care provider   The d/c medications (dosage and indication) were transmitted to the next care provider    The continuing care plan was transmitted to the next care provider

## 2021-06-07 ENCOUNTER — TELEPHONE (OUTPATIENT)
Dept: ADMINISTRATIVE | Age: 51
End: 2021-06-07

## 2021-06-07 ENCOUNTER — NURSE TRIAGE (OUTPATIENT)
Dept: OTHER | Facility: CLINIC | Age: 51
End: 2021-06-07

## 2021-06-07 NOTE — TELEPHONE ENCOUNTER
Pt having abdominal pain and rectal bleeding. Warm transfer to nurse access [facility]).

## 2021-06-07 NOTE — TELEPHONE ENCOUNTER
Received call from Harlan at [facility][note 3]-service [facility]) [place] with [employer] Complaint. rectal  Brief description of triage: rectal bleeding    Triage indicates for patient to be seen today by pcp or urgent care     Care advice provided, patient verbalizes understanding; denies any other questions or concerns; instructed to call back for any new or worsening symptoms. Writer provided warm transfer to Pelican at [facility] for appointment scheduling. Attention Provider: Thank you for allowing me to participate in the care of your patient. The patient was connected to triage in response to information provided to the [facility]. Please do not respond through this encounter as the response is not directed to a shared pool. Reminders:     Call [address] Provider/Office    Care Advice - both instruction and documentation    Routing - PCP (and NP for 2nd level triage)    PLEASE DELETE ALL RED TEXT PRIOR TO SIGNING NOTE        Reason for Disposition   MODERATE rectal bleeding (small blood clots, passing blood without stool, or toilet water turns red)    Answer Assessment - Initial Assessment Questions  1. APPEARANCE of BLOOD: \"What color is it? \" \"Is it passed separately, on the surface of the stool, or mixed in with the stool? \"       Red    2. AMOUNT: \"How much blood was passed? \"   Down her leg she states    3. FREQUENCY: \"How many times has blood been passed with the stools? \"   1    4. ONSET: \"When was the blood first seen in the stools? \" (Days or weeks)     No blood in stools    5. DIARRHEA: \"Is there also some diarrhea? \" If so, ask: \"How many diarrhea stools were passed in past 24 hours? \"   No    6. CONSTIPATION: \"Do you have constipation? \" If so, \"How bad is it? \"  No    7. RECURRENT SYMPTOMS: \"Have you had blood in your stools before? \" If so, ask: \"When was the last time? \" and \"What happened that time? \"      no  8. BLOOD THINNERS: \"Do you take any blood thinners? \" (e.g., Coumadin/warfarin,

## 2022-06-08 ENCOUNTER — NURSE TRIAGE (OUTPATIENT)
Dept: OTHER | Facility: CLINIC | Age: 52
End: 2022-06-08

## 2022-06-09 NOTE — TELEPHONE ENCOUNTER
Subjective: Caller states \"I was seen by a virtual visit with [facility] yesterday and told I had strep throat. Rx antibiotic and told to take Ibuprofen. Fever persists and pain is bad. \"     Current Symptoms: sore throat, fever. Rx Amoxicillin, 1st dose 6/7 around noon. Has had a total of 3 doses. Onset: 2 days ago;     Associated Symptoms: reduced activity, reduced appetite, reduced fluid intake    Pain Severity: hurts to swallow. Temperature: 101.1     What has been tried: drinking water. Taking Rx antibiotics < 48 hours. Recommended disposition: Yamilex Arringotn 9096 advice provided, patient verbalizes understanding; denies any other questions or concerns; instructed to call back for any new or worsening symptoms. Home Care. Call back tomorrow if fever persists and throat is same or worsening. Caller is in [place] at a hotel. [facility] services this area. If she calls back on Thursday, try and set up appointment for her to be seen. This triage is a result of a call to [address] [place] [address]. Please do not respond to the triage nurse through this encounter. Any subsequent communication should be directly with the patient. Caller DOES NOT have a PCP at this time. No longer see's Ady - she moved.         Reason for Disposition   [1] Taking antibiotics < 48 hours for strep throat AND [2] fever persists    Protocols used: STREP THROAT INFECTION ON ANTIBIOTIC FOLLOW-UP CALL-ADULT-

## 2024-05-07 NOTE — PROGRESS NOTES
ENDOSCOPY PREOP INSTRUCTIONS      Please at arrival time given to you from your doctor's office.  Report to the MAIN entrance on [address] and register at the surgery center on the left-hand side of the lobby  You will need your insurance card and photo id and a list of all medications taken on a regular basis. Please include the dose/frequency.    For your procedure:     PLEASE FOLLOW ALL INSTRUCTIONS & PREPS GIVEN TO YOU BY YOUR DOCTOR'S OFFICE.    If you have not received these instructions yet, please call the office immediately. Make sure to read them as soon as received.   If you are taking blood thinners, Aspirin or diabetic medication, make sure to call your doctor as soon as possible for instructions prior to your procedure.  Please dress comfortably and do not wear any lotion, powders or jewelry  If you use oxygen at home, please bring your oxygen tank with you to hospital.  Arrange for someone to be with you and sign you out & drive you home after your procedure.  THIS PERSON MUST WAIT AT HOSPITAL THE ENTIRE TIME.  We allow 2 adult visitors with you in the hospital & masks are strongly recommended.    WOMEN ONLY OF CHILDBEARING AGE: Please make sure to be able to give a urine sample on arrival      If you have further questions, you may contact your Endoscopist's office or Pre Admission Testing staff at [phone]

## 2024-05-08 ENCOUNTER — ANESTHESIA (OUTPATIENT)
Dept: ENDOSCOPY | Age: 54
End: 2024-05-08
Payer: COMMERCIAL

## 2024-05-08 ENCOUNTER — ANESTHESIA EVENT (OUTPATIENT)
Dept: ENDOSCOPY | Age: 54
End: 2024-05-08
Payer: COMMERCIAL

## 2024-05-08 ENCOUNTER — HOSPITAL ENCOUNTER (OUTPATIENT)
Age: 54
Setting detail: OUTPATIENT SURGERY
Discharge: HOME | End: 2024-05-08
Attending: INTERNAL MEDICINE | Admitting: INTERNAL MEDICINE
Payer: COMMERCIAL

## 2024-05-08 VITALS
RESPIRATION RATE: 16 BRPM | WEIGHT: 285 LBS | HEART RATE: 67 BPM | TEMPERATURE: 97.2 F | OXYGEN SATURATION: 100 % | HEIGHT: 65 IN | SYSTOLIC BLOOD PRESSURE: 128 MMHG | DIASTOLIC BLOOD PRESSURE: 81 MMHG | BODY MASS INDEX: 47.48 KG/M2

## 2024-05-08 PROCEDURE — 2709999900 HC NON-CHARGEABLE SUPPLY: Performed by: INTERNAL MEDICINE

## 2024-05-08 PROCEDURE — 6360000002 HC RX W HCPCS: Performed by: NURSE ANESTHETIST, CERTIFIED REGISTERED

## 2024-05-08 PROCEDURE — 7100000010 HC PHASE II RECOVERY - FIRST 15 MIN: Performed by: INTERNAL MEDICINE

## 2024-05-08 PROCEDURE — 7100000011 HC PHASE II RECOVERY - ADDTL 15 MIN: Performed by: INTERNAL MEDICINE

## 2024-05-08 PROCEDURE — 3700000001 HC ADD 15 MINUTES (ANESTHESIA): Performed by: INTERNAL MEDICINE

## 2024-05-08 PROCEDURE — 3700000000 HC ANESTHESIA ATTENDED CARE: Performed by: INTERNAL MEDICINE

## 2024-05-08 PROCEDURE — 3609027000 HC COLONOSCOPY: Performed by: INTERNAL MEDICINE

## 2024-05-08 PROCEDURE — 2580000003 HC RX 258: Performed by: ANESTHESIOLOGY

## 2024-05-08 RX ORDER — SODIUM CHLORIDE 0.9 % (FLUSH) 0.9 %
5-40 SYRINGE (ML) INJECTION EVERY 12 HOURS SCHEDULED
Status: DISCONTINUED | OUTPATIENT
Start: 2024-05-08 | End: 2024-05-08 | Stop reason: HOSPADM

## 2024-05-08 RX ORDER — PROPOFOL 10 MG/ML
INJECTION, EMULSION INTRAVENOUS PRN
Status: DISCONTINUED | OUTPATIENT
Start: 2024-05-08 | End: 2024-05-08 | Stop reason: SDUPTHER

## 2024-05-08 RX ORDER — LIDOCAINE HYDROCHLORIDE 10 MG/ML
1 INJECTION, SOLUTION EPIDURAL; INFILTRATION; INTRACAUDAL; PERINEURAL
Status: DISCONTINUED | OUTPATIENT
Start: 2024-05-08 | End: 2024-05-08 | Stop reason: HOSPADM

## 2024-05-08 RX ORDER — SODIUM CHLORIDE, SODIUM LACTATE, POTASSIUM CHLORIDE, CALCIUM CHLORIDE 600; 310; 30; 20 MG/100ML; MG/100ML; MG/100ML; MG/100ML
INJECTION, SOLUTION INTRAVENOUS CONTINUOUS
Status: DISCONTINUED | OUTPATIENT
Start: 2024-05-08 | End: 2024-05-08 | Stop reason: HOSPADM

## 2024-05-08 RX ORDER — OMEPRAZOLE 20 MG/1
40 TABLET, DELAYED RELEASE ORAL DAILY
COMMUNITY

## 2024-05-08 RX ORDER — LIDOCAINE HYDROCHLORIDE 20 MG/ML
INJECTION, SOLUTION INTRAVENOUS PRN
Status: DISCONTINUED | OUTPATIENT
Start: 2024-05-08 | End: 2024-05-08 | Stop reason: SDUPTHER

## 2024-05-08 RX ORDER — SODIUM CHLORIDE 0.9 % (FLUSH) 0.9 %
5-40 SYRINGE (ML) INJECTION PRN
Status: DISCONTINUED | OUTPATIENT
Start: 2024-05-08 | End: 2024-05-08 | Stop reason: HOSPADM

## 2024-05-08 RX ADMIN — LIDOCAINE HYDROCHLORIDE 50 MG: 20 INJECTION, SOLUTION INTRAVENOUS at 14:10

## 2024-05-08 RX ADMIN — PROPOFOL 150 MG: 10 INJECTION, EMULSION INTRAVENOUS at 14:06

## 2024-05-08 RX ADMIN — PROPOFOL 60 MG: 10 INJECTION, EMULSION INTRAVENOUS at 14:20

## 2024-05-08 RX ADMIN — SODIUM CHLORIDE, POTASSIUM CHLORIDE, SODIUM LACTATE AND CALCIUM CHLORIDE: 600; 310; 30; 20 INJECTION, SOLUTION INTRAVENOUS at 13:27

## 2024-05-08 RX ADMIN — PROPOFOL 100 MG: 10 INJECTION, EMULSION INTRAVENOUS at 14:14

## 2024-05-08 ASSESSMENT — PAIN - FUNCTIONAL ASSESSMENT: PAIN_FUNCTIONAL_ASSESSMENT: 0-10

## 2024-05-08 NOTE — ANESTHESIA POSTPROCEDURE EVALUATION
Department of Anesthesiology  Postprocedure Note    Patient: Olivia Curiel  MRN: [id number]  YOB: 1970  Date of evaluation: 5/8/2024    Procedure Summary       Date: 05/08/24 Room / Location: [place] [address] / [facility]    Anesthesia Start: 1402 Anesthesia Stop: 1428    Procedure: COLONOSCOPY Diagnosis:       Screen for colon cancer      (Screen for colon cancer [Z12.11])    Surgeons: Lore Ko MD Responsible Provider: Felipe Sparrow MD    Anesthesia Type: MAC ASA Status: 2            Anesthesia Type: No value filed.    Mamta Phase I: Mamta Score: 10    Mamta Phase II: Mamta Score: 10    Anesthesia Post Evaluation    Patient location during evaluation: PACU  Patient participation: complete - patient participated  Level of consciousness: awake and alert  Pain score: 0  Airway patency: patent  Nausea & Vomiting: no nausea and no vomiting  Cardiovascular status: hemodynamically stable  Respiratory status: acceptable  Hydration status: stable  Pain management: adequate and satisfactory to patient    No notable events documented.

## 2024-05-08 NOTE — H&P
[facility] ENDOSCOPY  Outpatient Procedure H&P    Patient: Olivia Curiel MRN: [id number]     YOB: 1970  Age: 53 y.o.  Sex: female    Unit: [facility] ENDOSCOPY Room/Bed: Endo Pool/NONE Location: [facility]     Procedure: Procedure(s):  COLONOSCOPY    Indication: Pre-Op Diagnosis Codes:     * Screen for colon cancer [Z12.11]    Referring  Physician:          Nurses past medical history notes reviewed and agreed.   Medications reviewed.    Allergies: Sulfa antibiotics     Allergies noted: Yes     Past Medical History:   Past Medical History:   Diagnosis Date    Acid reflux     ADD (attention deficit disorder)     Anemia     Depression     PCOS (polycystic ovarian syndrome)     Polycystic ovarian syndrome     Thyroid disorder        Past Surgical History:   Past Surgical History:   Procedure Laterality Date    FRACTURE SURGERY      left leg and ankle    TONSILLECTOMY         Social History:   Social History     Socioeconomic History    Marital status:      Spouse name: Not on file    Number of children: Not on file    Years of education: Not on file    Highest education level: Not on file   Occupational History    Not on file   Tobacco Use    Smoking status: Never    Smokeless tobacco: Never   Vaping Use    Vaping Use: Never used   Substance and Sexual Activity    Alcohol use: No     Comment: rarely    Drug use: No    Sexual activity: Yes     Partners: Male     Birth control/protection: I.U.D.   Other Topics Concern    Not on file   Social History Narrative    Not on file     Social Determinants of Health     Financial Resource Strain: Not on file   Food Insecurity: Not on file   Transportation Needs: Not on file   Physical Activity: Not on file   Stress: Not on file   Social Connections: Not on file   Intimate Partner Violence: Not on file   Housing Stability: Not on file       Family History: History reviewed. No pertinent family history.    Home Medications:   Prior to Admission

## 2024-05-08 NOTE — OP NOTE
Colonoscopy Procedure Note    Patient: Olivia Curiel MRN: [id number]     YOB: 1970  Age: 53 y.o.  Sex: female    Unit: [facility] ENDOSCOPY Room/Bed: [facility]/NONE Location: [facility]       Colonoscopy    Admitting Physician: LORE MITCHELL     Primary Care Physician: Crystal, Pcp      Preoperative Diagnosis: Pre-Op Diagnosis Codes:     * Screen for colon cancer [Z12.11]      DATE OF OPERATION: 5/8/2024    OPERATIVE SURGEON: Lore Mitchell MD      ANESTHESIA: General      Procedure Details:    After informed consent was obtained with all risks and benefits of procedure explained and preoperative exam completed, the patient was taken to the endoscopy suite and placed in the left lateral decubitus position. Upon sequential sedation as per above, a digital rectal exam was performed and was normal.  The Olympus videocolonoscope  was inserted in the rectum and carefully advanced to the cecum. Cecum Intubated Yes. The quality of preparation was good.  The colonoscope was slowly withdrawn with careful evaluation between folds. Retroflexion in the rectum was performed.      Estimated Blood Loss: minimal    Complications:  none    Findings:   normal colonic mucosa throughout  diverticulosis,  Moderate in degree, involving the sigmoid  hemorrhoids internal and external, Small in size    Plan: Repeat colonoscopy for screening purposes in 10 years.        Signed By: Lore Mitchell MD

## 2024-05-08 NOTE — PROGRESS NOTES
Ambulatory Surgery/Procedure Discharge Note    Vitals:    05/08/24 1450   BP: 128/81   Pulse: 67   Resp: 16   Temp:    SpO2: 100%   Pt meets discharge criteria per Mamat score.    In: 550 [I.V.:550]  Out: -     Restroom use offered before discharge.  Yes    Pain assessment:  none  Pain Level: 0    Pt and S.O./family states \"ready to go home\". Pt alert and oriented x4. IV removed. Denies N/V or pain.  Pt tolerating po intake. Discharge instructions given to pt and fiance with pt permission. Pt and fiance verbalized understanding of all instructions. Left with all belongings,and discharge instructions.     Patient discharged to home/self care. Patient discharged via wheel chair by transporter to waiting family/S.O.       5/8/2024 2:58 PM   Reassess 1 week with ultrasound

## 2024-05-08 NOTE — DISCHARGE INSTRUCTIONS
ENDOSCOPY DISCHARGE INSTRUCTIONS:    Call the physician that did your procedure for any questions or concern:    [facility]: [phone]  DR. JACKIE MITCHELL          ACTIVITY:    There are potential side effects to the medications used for sedation and anesthesia during your procedure.  These include:  Dizziness or light-headedness, confusion or memory loss, delayed reaction times, loss of coordination, nausea and vomiting.  Because of your increased risk for injury, we ask that you observe the following precautions:  For the next 24 hours,  DO NOT operate an automobile, bicycle, motorcycle, , power tools or large equipment of any kind.  Do not drink alcohol, sign any legal documents or make any legal decisions for 24 hours.  Do not bend your head over lower than your heart.  DO sit on the side of bed/couch awhile before getting up.  Plan on bedrest or quiet relaxation today.  You may resume normal activities in 24 hours.    DIET:    Your first meal today should be light, avoiding spicy and fatty foods.  If you tolerate this first meal, then you may advance to your regular diet unless otherwise advised by your physician.    NORMAL SYMPTOMS:  -Mild sore throat if you’ve had an EGD   -Gaseous discomfort    NOTIFY YOUR PHYSICIAN IF THESE SYMPTOMS OCCUR:  1. Fever (greater than 100)  5. Increased abdominal bloating  2. Severe pain    6. Excessive bleeding  3. Nausea and vomiting  7. Chest pain                                                                    4. Chills    8. Shortness of breath    ADDITIONAL INSTRUCTIONS:    Biopsy results: Call [facility] for biopsy results in 1 week    Educational Information:          Please review these discharge instructions this evening or tomorrow for  information you may have forgotten.            We want to thank you for choosing the [facility] as your health care provider. We always strive to provide you with excellent care while you are here. You

## 2024-05-08 NOTE — ANESTHESIA PRE PROCEDURE
Applicable):  No results found for: \"LABABO\"    Drug/Infectious Status (If Applicable):  No results found for: \"HIV\", \"HEPCAB\"    COVID-19 Screening (If Applicable):   Lab Results   Component Value Date/Time    COVID19 NOT DETECTED 05/29/2021 08:22 AM           Anesthesia Evaluation  Patient summary reviewed and Nursing notes reviewed   no history of anesthetic complications:   Airway: Mallampati: II  TM distance: >3 FB   Neck ROM: full  Mouth opening: > = 3 FB   Dental: normal exam         Pulmonary:normal exam    (+)     sleep apnea:                                  Cardiovascular:Negative CV ROS                      Neuro/Psych:   (+) psychiatric history:            GI/Hepatic/Renal:   (+) GERD:, morbid obesity          Endo/Other: Negative Endo/Other ROS                    Abdominal:             Vascular: negative vascular ROS.         Other Findings:       Anesthesia Plan      MAC     ASA 2       Induction: intravenous.    MIPS: Prophylactic antiemetics administered.  Anesthetic plan and risks discussed with patient.      Plan discussed with CRNA.    Attending anesthesiologist reviewed and agrees with Preprocedure content            Felipe Sparrow MD   5/8/2024

## (undated) DEVICE — CANNULA SAMP CO2 AD GRN 7FT CO2 AND 7FT O2 TBNG UNIV CONN